# Patient Record
Sex: MALE | Race: WHITE | Employment: FULL TIME | URBAN - NONMETROPOLITAN AREA
[De-identification: names, ages, dates, MRNs, and addresses within clinical notes are randomized per-mention and may not be internally consistent; named-entity substitution may affect disease eponyms.]

---

## 2017-02-18 DIAGNOSIS — I10 ESSENTIAL HYPERTENSION: Primary | ICD-10-CM

## 2017-02-18 RX ORDER — NEBIVOLOL HYDROCHLORIDE 5 MG/1
TABLET ORAL
Qty: 30 TABLET | Refills: 0 | Status: SHIPPED | OUTPATIENT
Start: 2017-02-18 | End: 2017-03-17

## 2017-03-17 RX ORDER — NEBIVOLOL HYDROCHLORIDE 5 MG/1
TABLET ORAL
Qty: 30 TABLET | Refills: 0 | Status: SHIPPED | OUTPATIENT
Start: 2017-03-17 | End: 2017-04-20

## 2017-04-14 ENCOUNTER — APPOINTMENT (OUTPATIENT)
Dept: LAB | Age: 67
End: 2017-04-14
Attending: FAMILY MEDICINE
Payer: COMMERCIAL

## 2017-04-14 ENCOUNTER — OFFICE VISIT (OUTPATIENT)
Dept: FAMILY MEDICINE CLINIC | Facility: CLINIC | Age: 67
End: 2017-04-14

## 2017-04-14 VITALS
SYSTOLIC BLOOD PRESSURE: 130 MMHG | HEIGHT: 72.75 IN | WEIGHT: 188.25 LBS | DIASTOLIC BLOOD PRESSURE: 68 MMHG | TEMPERATURE: 98 F | BODY MASS INDEX: 24.95 KG/M2

## 2017-04-14 DIAGNOSIS — Z72.0 TOBACCO ABUSE: ICD-10-CM

## 2017-04-14 DIAGNOSIS — I10 ESSENTIAL HYPERTENSION: ICD-10-CM

## 2017-04-14 DIAGNOSIS — I10 ESSENTIAL HYPERTENSION: Primary | ICD-10-CM

## 2017-04-14 DIAGNOSIS — R73.9 HYPERGLYCEMIA: ICD-10-CM

## 2017-04-14 DIAGNOSIS — L30.9 ECZEMA, UNSPECIFIED TYPE: ICD-10-CM

## 2017-04-14 PROCEDURE — 83036 HEMOGLOBIN GLYCOSYLATED A1C: CPT | Performed by: FAMILY MEDICINE

## 2017-04-14 PROCEDURE — 80061 LIPID PANEL: CPT | Performed by: FAMILY MEDICINE

## 2017-04-14 PROCEDURE — 36415 COLL VENOUS BLD VENIPUNCTURE: CPT | Performed by: FAMILY MEDICINE

## 2017-04-14 PROCEDURE — 99214 OFFICE O/P EST MOD 30 MIN: CPT | Performed by: FAMILY MEDICINE

## 2017-04-14 PROCEDURE — 82043 UR ALBUMIN QUANTITATIVE: CPT | Performed by: FAMILY MEDICINE

## 2017-04-14 PROCEDURE — 80053 COMPREHEN METABOLIC PANEL: CPT | Performed by: FAMILY MEDICINE

## 2017-04-14 PROCEDURE — 82570 ASSAY OF URINE CREATININE: CPT | Performed by: FAMILY MEDICINE

## 2017-04-14 NOTE — PROGRESS NOTES
HPI:    Patient ID: Dewayne Heart is a 69year old male. Pt doing well  BP stable  W/o problems w/ meds  + rash skin / dry / itchy  HPI    Review of Systems   Constitutional: Negative for fever and chills. HENT: Negative.     Respiratory: Negative for

## 2017-04-15 DIAGNOSIS — I10 ESSENTIAL HYPERTENSION: Primary | ICD-10-CM

## 2017-04-15 DIAGNOSIS — R73.9 HYPERGLYCEMIA: ICD-10-CM

## 2017-04-20 RX ORDER — NEBIVOLOL HYDROCHLORIDE 5 MG/1
TABLET ORAL
Qty: 30 TABLET | Refills: 0 | Status: SHIPPED | OUTPATIENT
Start: 2017-04-20 | End: 2017-05-18

## 2017-05-18 RX ORDER — NEBIVOLOL HYDROCHLORIDE 5 MG/1
TABLET ORAL
Qty: 30 TABLET | Refills: 6 | Status: SHIPPED | OUTPATIENT
Start: 2017-05-18 | End: 2017-12-01

## 2017-10-20 ENCOUNTER — APPOINTMENT (OUTPATIENT)
Dept: LAB | Age: 67
End: 2017-10-20
Attending: FAMILY MEDICINE
Payer: COMMERCIAL

## 2017-10-20 ENCOUNTER — OFFICE VISIT (OUTPATIENT)
Dept: FAMILY MEDICINE CLINIC | Facility: CLINIC | Age: 67
End: 2017-10-20

## 2017-10-20 VITALS
HEIGHT: 72 IN | SYSTOLIC BLOOD PRESSURE: 120 MMHG | HEART RATE: 67 BPM | DIASTOLIC BLOOD PRESSURE: 70 MMHG | BODY MASS INDEX: 25.07 KG/M2 | WEIGHT: 185.13 LBS | OXYGEN SATURATION: 99 % | TEMPERATURE: 98 F

## 2017-10-20 DIAGNOSIS — Z72.0 TOBACCO ABUSE: ICD-10-CM

## 2017-10-20 DIAGNOSIS — Z23 NEED FOR VACCINATION: ICD-10-CM

## 2017-10-20 DIAGNOSIS — I10 ESSENTIAL HYPERTENSION: Primary | ICD-10-CM

## 2017-10-20 DIAGNOSIS — R73.9 HYPERGLYCEMIA: ICD-10-CM

## 2017-10-20 PROCEDURE — 90471 IMMUNIZATION ADMIN: CPT | Performed by: FAMILY MEDICINE

## 2017-10-20 PROCEDURE — 99214 OFFICE O/P EST MOD 30 MIN: CPT | Performed by: FAMILY MEDICINE

## 2017-10-20 PROCEDURE — 90732 PPSV23 VACC 2 YRS+ SUBQ/IM: CPT | Performed by: FAMILY MEDICINE

## 2017-10-20 NOTE — PROGRESS NOTES
HPI:    Patient ID: Lucille Peterson is a 69year old male. Pt doing well  BP stable  + diet / wt loss  HPI    Review of Systems   Constitutional: Negative for fatigue. Respiratory: Negative for cough and shortness of breath.     Cardiovascular: Negative

## 2017-10-23 DIAGNOSIS — R73.9 HYPERGLYCEMIA: Primary | ICD-10-CM

## 2017-10-23 DIAGNOSIS — I10 ESSENTIAL HYPERTENSION: ICD-10-CM

## 2017-12-01 RX ORDER — NEBIVOLOL HYDROCHLORIDE 5 MG/1
TABLET ORAL
Qty: 30 TABLET | Refills: 2 | Status: SHIPPED | OUTPATIENT
Start: 2017-12-01 | End: 2018-02-23

## 2018-02-03 ENCOUNTER — NURSE ONLY (OUTPATIENT)
Dept: FAMILY MEDICINE CLINIC | Facility: CLINIC | Age: 68
End: 2018-02-03

## 2018-02-03 VITALS — DIASTOLIC BLOOD PRESSURE: 70 MMHG | SYSTOLIC BLOOD PRESSURE: 122 MMHG

## 2018-02-12 ENCOUNTER — NURSE ONLY (OUTPATIENT)
Dept: FAMILY MEDICINE CLINIC | Facility: CLINIC | Age: 68
End: 2018-02-12

## 2018-02-19 ENCOUNTER — OFFICE VISIT (OUTPATIENT)
Dept: FAMILY MEDICINE CLINIC | Facility: CLINIC | Age: 68
End: 2018-02-19

## 2018-02-19 ENCOUNTER — APPOINTMENT (OUTPATIENT)
Dept: LAB | Age: 68
End: 2018-02-19
Attending: FAMILY MEDICINE
Payer: COMMERCIAL

## 2018-02-19 VITALS
BODY MASS INDEX: 26.04 KG/M2 | WEIGHT: 186 LBS | RESPIRATION RATE: 12 BRPM | TEMPERATURE: 98 F | SYSTOLIC BLOOD PRESSURE: 128 MMHG | HEART RATE: 60 BPM | DIASTOLIC BLOOD PRESSURE: 70 MMHG | HEIGHT: 70.75 IN

## 2018-02-19 VITALS — SYSTOLIC BLOOD PRESSURE: 128 MMHG | DIASTOLIC BLOOD PRESSURE: 70 MMHG

## 2018-02-19 DIAGNOSIS — R73.9 HYPERGLYCEMIA: ICD-10-CM

## 2018-02-19 DIAGNOSIS — Z72.0 TOBACCO ABUSE: ICD-10-CM

## 2018-02-19 DIAGNOSIS — I10 ESSENTIAL HYPERTENSION: Primary | ICD-10-CM

## 2018-02-19 LAB
ALBUMIN SERPL-MCNC: 3.7 G/DL (ref 3.5–4.8)
ALP LIVER SERPL-CCNC: 68 U/L (ref 45–117)
ALT SERPL-CCNC: 21 U/L (ref 17–63)
AST SERPL-CCNC: 17 U/L (ref 15–41)
BILIRUB SERPL-MCNC: 0.4 MG/DL (ref 0.1–2)
BUN BLD-MCNC: 10 MG/DL (ref 8–20)
CALCIUM BLD-MCNC: 9.4 MG/DL (ref 8.3–10.3)
CHLORIDE: 107 MMOL/L (ref 101–111)
CHOLEST SMN-MCNC: 144 MG/DL (ref ?–200)
CO2: 27 MMOL/L (ref 22–32)
CREAT BLD-MCNC: 0.76 MG/DL (ref 0.7–1.3)
CREAT UR-SCNC: 46.3 MG/DL
EST. AVERAGE GLUCOSE BLD GHB EST-MCNC: 137 MG/DL (ref 68–126)
GLUCOSE BLD-MCNC: 108 MG/DL (ref 70–99)
HBA1C MFR BLD HPLC: 6.4 % (ref ?–5.7)
HDLC SERPL-MCNC: 36 MG/DL (ref 45–?)
HDLC SERPL: 4 {RATIO} (ref ?–4.97)
LDLC SERPL CALC-MCNC: 85 MG/DL (ref ?–130)
M PROTEIN MFR SERPL ELPH: 7.4 G/DL (ref 6.1–8.3)
MICROALBUMIN UR-MCNC: <0.5 MG/DL
NONHDLC SERPL-MCNC: 108 MG/DL (ref ?–130)
POTASSIUM SERPL-SCNC: 4.8 MMOL/L (ref 3.6–5.1)
SODIUM SERPL-SCNC: 139 MMOL/L (ref 136–144)
TRIGL SERPL-MCNC: 115 MG/DL (ref ?–150)
VLDLC SERPL CALC-MCNC: 23 MG/DL (ref 5–40)

## 2018-02-19 PROCEDURE — 99214 OFFICE O/P EST MOD 30 MIN: CPT | Performed by: FAMILY MEDICINE

## 2018-02-19 NOTE — PROGRESS NOTES
HPI:    Patient ID: Praveena Gandhi is a 77year old male. BP stable  W/o problems w/ meds    HPI    Review of Systems   Respiratory: Negative for cough and shortness of breath. Cardiovascular: Negative for chest pain, palpitations and leg swelling.

## 2018-02-20 ENCOUNTER — TELEPHONE (OUTPATIENT)
Dept: FAMILY MEDICINE CLINIC | Facility: CLINIC | Age: 68
End: 2018-02-20

## 2018-02-20 DIAGNOSIS — I10 ESSENTIAL HYPERTENSION: Primary | ICD-10-CM

## 2018-02-20 DIAGNOSIS — Z00.00 ROUTINE HEALTH MAINTENANCE: ICD-10-CM

## 2018-02-20 DIAGNOSIS — R73.9 HYPERGLYCEMIA: ICD-10-CM

## 2018-02-20 NOTE — TELEPHONE ENCOUNTER
Patient is not happy with increase in Hgbaic; he hasn't done anything different. Advised- if he hasn't done anything from dietary or exercise standpoint; then it is his body's metabolism.   ej/cj

## 2018-02-23 RX ORDER — NEBIVOLOL HYDROCHLORIDE 5 MG/1
TABLET ORAL
Qty: 30 TABLET | Refills: 3 | Status: SHIPPED | OUTPATIENT
Start: 2018-02-23 | End: 2018-06-18

## 2018-06-18 RX ORDER — NEBIVOLOL HYDROCHLORIDE 5 MG/1
TABLET ORAL
Qty: 30 TABLET | Refills: 2 | Status: SHIPPED | OUTPATIENT
Start: 2018-06-18 | End: 2018-10-22

## 2018-09-21 ENCOUNTER — OFFICE VISIT (OUTPATIENT)
Dept: FAMILY MEDICINE CLINIC | Facility: CLINIC | Age: 68
End: 2018-09-21
Payer: COMMERCIAL

## 2018-09-21 ENCOUNTER — APPOINTMENT (OUTPATIENT)
Dept: LAB | Age: 68
End: 2018-09-21
Attending: FAMILY MEDICINE
Payer: COMMERCIAL

## 2018-09-21 VITALS
HEIGHT: 70 IN | HEART RATE: 64 BPM | RESPIRATION RATE: 12 BRPM | SYSTOLIC BLOOD PRESSURE: 120 MMHG | DIASTOLIC BLOOD PRESSURE: 62 MMHG | BODY MASS INDEX: 26.13 KG/M2 | WEIGHT: 182.5 LBS | TEMPERATURE: 97 F

## 2018-09-21 DIAGNOSIS — H61.23 BILATERAL IMPACTED CERUMEN: Primary | ICD-10-CM

## 2018-09-21 DIAGNOSIS — I10 ESSENTIAL HYPERTENSION: ICD-10-CM

## 2018-09-21 DIAGNOSIS — R73.9 HYPERGLYCEMIA: ICD-10-CM

## 2018-09-21 DIAGNOSIS — Z72.0 TOBACCO ABUSE: ICD-10-CM

## 2018-09-21 LAB
ALBUMIN SERPL-MCNC: 3.9 G/DL (ref 3.5–4.8)
ALBUMIN/GLOB SERPL: 1 {RATIO} (ref 1–2)
ALP LIVER SERPL-CCNC: 69 U/L (ref 45–117)
ALT SERPL-CCNC: 22 U/L (ref 17–63)
ANION GAP SERPL CALC-SCNC: 6 MMOL/L (ref 0–18)
AST SERPL-CCNC: 20 U/L (ref 15–41)
BILIRUB SERPL-MCNC: 0.5 MG/DL (ref 0.1–2)
BUN BLD-MCNC: 11 MG/DL (ref 8–20)
BUN/CREAT SERPL: 14.1 (ref 10–20)
CALCIUM BLD-MCNC: 9.4 MG/DL (ref 8.3–10.3)
CHLORIDE SERPL-SCNC: 104 MMOL/L (ref 101–111)
CHOLEST SMN-MCNC: 153 MG/DL (ref ?–200)
CO2 SERPL-SCNC: 26 MMOL/L (ref 22–32)
CREAT BLD-MCNC: 0.78 MG/DL (ref 0.7–1.3)
CREAT UR-SCNC: 126 MG/DL
EST. AVERAGE GLUCOSE BLD GHB EST-MCNC: 126 MG/DL (ref 68–126)
GLOBULIN PLAS-MCNC: 4.1 G/DL (ref 2.5–4)
GLUCOSE BLD-MCNC: 109 MG/DL (ref 70–99)
HBA1C MFR BLD HPLC: 6 % (ref ?–5.7)
HDLC SERPL-MCNC: 36 MG/DL (ref 40–59)
LDLC SERPL CALC-MCNC: 94 MG/DL (ref ?–100)
M PROTEIN MFR SERPL ELPH: 8 G/DL (ref 6.1–8.3)
MICROALBUMIN UR-MCNC: 1.07 MG/DL
MICROALBUMIN/CREAT 24H UR-RTO: 8.5 UG/MG (ref ?–30)
NONHDLC SERPL-MCNC: 117 MG/DL (ref ?–130)
OSMOLALITY SERPL CALC.SUM OF ELEC: 282 MOSM/KG (ref 275–295)
POTASSIUM SERPL-SCNC: 5.6 MMOL/L (ref 3.6–5.1)
SODIUM SERPL-SCNC: 136 MMOL/L (ref 136–144)
TRIGL SERPL-MCNC: 117 MG/DL (ref 30–149)
VLDLC SERPL CALC-MCNC: 23 MG/DL (ref 0–30)

## 2018-09-21 PROCEDURE — 69210 REMOVE IMPACTED EAR WAX UNI: CPT | Performed by: FAMILY MEDICINE

## 2018-09-21 PROCEDURE — 99214 OFFICE O/P EST MOD 30 MIN: CPT | Performed by: FAMILY MEDICINE

## 2018-09-21 NOTE — PROGRESS NOTES
HPI:    Patient ID: Siddhartha Dubose is a 77year old male. BP stable w/o problems w/ meds  Breathing OK  Hearing loss w/o pain x months  W/o cough / cold  HPI    Review of Systems   Constitutional: Negative for chills, fatigue and fever.    HENT: Positive

## 2018-09-25 ENCOUNTER — TELEPHONE (OUTPATIENT)
Dept: FAMILY MEDICINE CLINIC | Facility: CLINIC | Age: 68
End: 2018-09-25

## 2018-09-25 DIAGNOSIS — R73.9 HYPERGLYCEMIA: ICD-10-CM

## 2018-09-25 DIAGNOSIS — E87.5 SERUM POTASSIUM ELEVATED: ICD-10-CM

## 2018-09-25 DIAGNOSIS — I10 ESSENTIAL HYPERTENSION: Primary | ICD-10-CM

## 2018-09-25 NOTE — TELEPHONE ENCOUNTER
----- Message from Shayy Robin DO sent at 9/23/2018  9:41 AM CDT -----  Labs stable. Recheck same lab in 6 months. Potassium elevated recommend rechecking potassium. Question whether from hemolysis.

## 2018-10-06 ENCOUNTER — NURSE ONLY (OUTPATIENT)
Dept: FAMILY MEDICINE CLINIC | Facility: CLINIC | Age: 68
End: 2018-10-06
Payer: COMMERCIAL

## 2018-10-06 DIAGNOSIS — E87.5 SERUM POTASSIUM ELEVATED: ICD-10-CM

## 2018-10-06 DIAGNOSIS — I10 ESSENTIAL HYPERTENSION: ICD-10-CM

## 2018-10-06 DIAGNOSIS — R73.9 HYPERGLYCEMIA: ICD-10-CM

## 2018-10-06 PROCEDURE — 84132 ASSAY OF SERUM POTASSIUM: CPT | Performed by: FAMILY MEDICINE

## 2018-10-06 PROCEDURE — 36415 COLL VENOUS BLD VENIPUNCTURE: CPT | Performed by: FAMILY MEDICINE

## 2018-10-06 NOTE — PROGRESS NOTES
1 mint tube collected from R AC using straight needle and 1 attempt    Pt tolerated and was sent home in stable condition

## 2018-10-22 ENCOUNTER — TELEPHONE (OUTPATIENT)
Dept: FAMILY MEDICINE CLINIC | Facility: CLINIC | Age: 68
End: 2018-10-22

## 2018-10-22 DIAGNOSIS — I10 ESSENTIAL HYPERTENSION: Primary | ICD-10-CM

## 2018-10-22 RX ORDER — NEBIVOLOL 5 MG/1
5 TABLET ORAL
Qty: 30 TABLET | Refills: 2 | Status: SHIPPED | OUTPATIENT
Start: 2018-10-22 | End: 2019-01-11

## 2019-01-11 DIAGNOSIS — I10 ESSENTIAL HYPERTENSION: ICD-10-CM

## 2019-01-11 DIAGNOSIS — R73.9 HYPERGLYCEMIA: Primary | ICD-10-CM

## 2019-01-11 RX ORDER — NEBIVOLOL HYDROCHLORIDE 5 MG/1
TABLET ORAL
Qty: 30 TABLET | Refills: 2 | Status: SHIPPED | OUTPATIENT
Start: 2019-01-11 | End: 2019-04-05

## 2019-03-19 ENCOUNTER — PATIENT OUTREACH (OUTPATIENT)
Dept: FAMILY MEDICINE CLINIC | Facility: CLINIC | Age: 69
End: 2019-03-19

## 2019-04-04 ENCOUNTER — HOSPITAL (OUTPATIENT)
Dept: OTHER | Age: 69
End: 2019-04-04

## 2019-04-04 LAB
ANALYZER ANC (IANC): ABNORMAL
ANION GAP SERPL CALC-SCNC: 16 MMOL/L (ref 10–20)
APTT PPP: 25 SECONDS (ref 22–32)
APTT PPP: NORMAL S
BASOPHILS # BLD: 0.1 THOUSAND/MCL (ref 0–0.3)
BASOPHILS NFR BLD: 0 %
BUN SERPL-MCNC: 11 MG/DL (ref 6–20)
BUN/CREAT SERPL: 18 (ref 7–25)
CALCIUM SERPL-MCNC: 9.4 MG/DL (ref 8.4–10.2)
CHLORIDE: 106 MMOL/L (ref 98–107)
CO2 SERPL-SCNC: 25 MMOL/L (ref 21–32)
CREAT SERPL-MCNC: 0.6 MG/DL (ref 0.67–1.17)
DIFFERENTIAL METHOD BLD: ABNORMAL
EOSINOPHIL # BLD: 0.1 THOUSAND/MCL (ref 0.1–0.5)
EOSINOPHIL NFR BLD: 1 %
ERYTHROCYTE [DISTWIDTH] IN BLOOD: 12.9 % (ref 11–15)
GLUCOSE SERPL-MCNC: 132 MG/DL (ref 65–99)
HEMATOCRIT: 45.4 % (ref 39–51)
HGB BLD-MCNC: 15.4 GM/DL (ref 13–17)
IMM GRANULOCYTES # BLD AUTO: 0.1 THOUSAND/MCL (ref 0–0.2)
IMM GRANULOCYTES NFR BLD: 1 %
INR PPP: 1
LYMPHOCYTES # BLD: 1.6 THOUSAND/MCL (ref 1–4)
LYMPHOCYTES NFR BLD: 13 %
MCH RBC QN AUTO: 33.3 PG (ref 26–34)
MCHC RBC AUTO-ENTMCNC: 33.9 GM/DL (ref 32–36.5)
MCV RBC AUTO: 98.1 FL (ref 78–100)
MONOCYTES # BLD: 0.6 THOUSAND/MCL (ref 0.3–0.9)
MONOCYTES NFR BLD: 5 %
NEUTROPHILS # BLD: 10 THOUSAND/MCL (ref 1.8–7.7)
NEUTROPHILS NFR BLD: 80 %
NEUTS SEG NFR BLD: ABNORMAL %
NRBC (NRBCRE): 0 /100 WBC
PLATELET # BLD: 259 THOUSAND/MCL (ref 140–450)
POTASSIUM SERPL-SCNC: 5 MMOL/L (ref 3.4–5.1)
PROTHROMBIN TIME: 10.5 SECONDS (ref 9.7–11.8)
PROTHROMBIN TIME: NORMAL
RBC # BLD: 4.63 MILLION/MCL (ref 4.5–5.9)
SODIUM SERPL-SCNC: 142 MMOL/L (ref 135–145)
WBC # BLD: 12.4 THOUSAND/MCL (ref 4.2–11)

## 2019-04-05 DIAGNOSIS — I10 ESSENTIAL HYPERTENSION: ICD-10-CM

## 2019-04-05 LAB
ANALYZER ANC (IANC): NORMAL
ANION GAP SERPL CALC-SCNC: 16 MMOL/L (ref 10–20)
BUN SERPL-MCNC: 9 MG/DL (ref 6–20)
BUN/CREAT SERPL: 14 (ref 7–25)
CALCIUM SERPL-MCNC: 8.8 MG/DL (ref 8.4–10.2)
CHLORIDE: 104 MMOL/L (ref 98–107)
CO2 SERPL-SCNC: 24 MMOL/L (ref 21–32)
CREAT SERPL-MCNC: 0.64 MG/DL (ref 0.67–1.17)
ERYTHROCYTE [DISTWIDTH] IN BLOOD: 13 % (ref 11–15)
GLUCOSE SERPL-MCNC: 126 MG/DL (ref 65–99)
HEMATOCRIT: 44.6 % (ref 39–51)
HGB BLD-MCNC: 14.7 GM/DL (ref 13–17)
MCH RBC QN AUTO: 32.5 PG (ref 26–34)
MCHC RBC AUTO-ENTMCNC: 33 GM/DL (ref 32–36.5)
MCV RBC AUTO: 98.5 FL (ref 78–100)
NRBC (NRBCRE): 0 /100 WBC
PLATELET # BLD: 250 THOUSAND/MCL (ref 140–450)
POTASSIUM SERPL-SCNC: 4 MMOL/L (ref 3.4–5.1)
RBC # BLD: 4.53 MILLION/MCL (ref 4.5–5.9)
SODIUM SERPL-SCNC: 140 MMOL/L (ref 135–145)
WBC # BLD: 9.4 THOUSAND/MCL (ref 4.2–11)

## 2019-04-05 RX ORDER — NEBIVOLOL HYDROCHLORIDE 5 MG/1
TABLET ORAL
Qty: 30 TABLET | Refills: 0 | Status: SHIPPED | OUTPATIENT
Start: 2019-04-05 | End: 2019-05-03

## 2019-04-05 NOTE — TELEPHONE ENCOUNTER
Last office visit 9-21-18  Last BP 9-21-18 120/62  Last refill 1-11-19 #30 with 2  Labs due.   Future Appointments   Date Time Provider Iván Rodriguez   4/22/2019  8:30 AM Angelita Croft DO [facility] [place]   4/22/2019  9:00 AM REF [facility] E

## 2019-04-06 ENCOUNTER — TELEPHONE (OUTPATIENT)
Dept: FAMILY MEDICINE CLINIC | Facility: CLINIC | Age: 69
End: 2019-04-06
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
## 2019-04-08 ENCOUNTER — TELEPHONE (OUTPATIENT)
Dept: NEUROSURGERY | Age: 69
End: 2019-04-08
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
## 2019-04-10 ENCOUNTER — TELEPHONE (OUTPATIENT)
Dept: NEUROSURGERY | Age: 69
End: 2019-04-10

## 2019-05-03 ENCOUNTER — OFFICE VISIT (OUTPATIENT)
Dept: NEUROSURGERY | Age: 69
End: 2019-05-03

## 2019-05-03 ENCOUNTER — APPOINTMENT (OUTPATIENT)
Dept: NEUROSURGERY | Age: 69
End: 2019-05-03

## 2019-05-03 ENCOUNTER — TELEPHONE (OUTPATIENT)
Dept: FAMILY MEDICINE CLINIC | Facility: CLINIC | Age: 69
End: 2019-05-03

## 2019-05-03 ENCOUNTER — HOSPITAL (OUTPATIENT)
Dept: OTHER | Age: 69
End: 2019-05-03

## 2019-05-03 ENCOUNTER — IMAGING SERVICES (OUTPATIENT)
Dept: GENERAL RADIOLOGY | Age: 69
End: 2019-05-03

## 2019-05-03 VITALS
DIASTOLIC BLOOD PRESSURE: 88 MMHG | OXYGEN SATURATION: 97 % | WEIGHT: 185 LBS | BODY MASS INDEX: 25.06 KG/M2 | HEART RATE: 64 BPM | SYSTOLIC BLOOD PRESSURE: 161 MMHG | RESPIRATION RATE: 16 BRPM | TEMPERATURE: 97.7 F | HEIGHT: 72 IN

## 2019-05-03 DIAGNOSIS — I10 ESSENTIAL HYPERTENSION: ICD-10-CM

## 2019-05-03 DIAGNOSIS — S19.9XXD NECK INJURY, SUBSEQUENT ENCOUNTER: ICD-10-CM

## 2019-05-03 DIAGNOSIS — S12.401D CLOSED NONDISPLACED FRACTURE OF FIFTH CERVICAL VERTEBRA WITH ROUTINE HEALING, UNSPECIFIED FRACTURE MORPHOLOGY, SUBSEQUENT ENCOUNTER: Primary | ICD-10-CM

## 2019-05-03 PROCEDURE — 99213 OFFICE O/P EST LOW 20 MIN: CPT | Performed by: PHYSICIAN ASSISTANT

## 2019-05-03 PROCEDURE — 72040 X-RAY EXAM NECK SPINE 2-3 VW: CPT | Performed by: INTERNAL MEDICINE

## 2019-05-03 RX ORDER — ASPIRIN 81 MG/1
81 TABLET ORAL
COMMUNITY

## 2019-05-03 RX ORDER — NEBIVOLOL 5 MG/1
TABLET ORAL
COMMUNITY
Start: 2019-05-03

## 2019-05-03 RX ORDER — NEBIVOLOL HYDROCHLORIDE 5 MG/1
TABLET ORAL
Qty: 30 TABLET | Refills: 0 | Status: SHIPPED | OUTPATIENT
Start: 2019-05-03 | End: 2019-06-02

## 2019-05-03 ASSESSMENT — ENCOUNTER SYMPTOMS
NUMBNESS: 0
CONSTITUTIONAL NEGATIVE: 1
WEAKNESS: 0
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
## 2019-05-04 NOTE — TELEPHONE ENCOUNTER
Left message for patient informing him he can schedule physical per last telephone encounter this month but if he has further questions or concerns can return call and discuss.

## 2019-05-04 NOTE — TELEPHONE ENCOUNTER
Returned patient phone call. Pt is under the impression you would like the neck issue \"resolved\" prior to physical. Does he need to be cleared from the neck brace prior to physical?    Please call patient back with Dr. Susanna Bridges response at [phone].

## 2019-05-20 ENCOUNTER — APPOINTMENT (OUTPATIENT)
Dept: LAB | Age: 69
End: 2019-05-20
Attending: FAMILY MEDICINE
Payer: COMMERCIAL

## 2019-05-20 ENCOUNTER — OFFICE VISIT (OUTPATIENT)
Dept: FAMILY MEDICINE CLINIC | Facility: CLINIC | Age: 69
End: 2019-05-20
Payer: COMMERCIAL

## 2019-05-20 VITALS
BODY MASS INDEX: 25.6 KG/M2 | DIASTOLIC BLOOD PRESSURE: 70 MMHG | HEART RATE: 64 BPM | OXYGEN SATURATION: 98 % | TEMPERATURE: 98 F | HEIGHT: 72 IN | WEIGHT: 189 LBS | SYSTOLIC BLOOD PRESSURE: 120 MMHG

## 2019-05-20 DIAGNOSIS — L30.9 ECZEMA, UNSPECIFIED TYPE: ICD-10-CM

## 2019-05-20 DIAGNOSIS — Z87.81 H/O CERVICAL FRACTURE: ICD-10-CM

## 2019-05-20 DIAGNOSIS — I10 ESSENTIAL HYPERTENSION: ICD-10-CM

## 2019-05-20 DIAGNOSIS — Z00.00 ROUTINE HEALTH MAINTENANCE: Primary | ICD-10-CM

## 2019-05-20 DIAGNOSIS — Z12.11 COLON CANCER SCREENING: ICD-10-CM

## 2019-05-20 DIAGNOSIS — R73.9 HYPERGLYCEMIA: ICD-10-CM

## 2019-05-20 DIAGNOSIS — Z72.0 TOBACCO ABUSE: ICD-10-CM

## 2019-05-20 PROCEDURE — 99397 PER PM REEVAL EST PAT 65+ YR: CPT | Performed by: FAMILY MEDICINE

## 2019-05-20 RX ORDER — CLOBETASOL PROPIONATE 0.5 MG/G
CREAM TOPICAL
Qty: 45 G | Refills: 1 | Status: SHIPPED | OUTPATIENT
Start: 2019-05-20 | End: 2020-12-30

## 2019-05-20 NOTE — PROGRESS NOTES
Berna Dahl is a 68year old male. No chief complaint on file.       HPI:   Px  S/p Fracture neck - immob on  W/o problems w/ meds  B stable    Current Outpatient Medications on File Prior to Visit:  BYSTOLIC 5 MG Oral Tab TAKE 1 TABLET(5 MG) BY MOUTH fracture,c5  Essential hypertension  Hyperglycemia  Tobacco abuse  Eczema, unspecified type  Colon cancer screening  Routine health maintenance  (primary encounter diagnosis)    No orders of the defined types were placed in this encounter.       Meds & Refi

## 2019-05-21 DIAGNOSIS — I10 ESSENTIAL HYPERTENSION: Primary | ICD-10-CM

## 2019-05-21 DIAGNOSIS — R73.9 HYPERGLYCEMIA: ICD-10-CM

## 2019-06-02 DIAGNOSIS — I10 ESSENTIAL HYPERTENSION: ICD-10-CM

## 2019-06-03 RX ORDER — NEBIVOLOL HYDROCHLORIDE 5 MG/1
TABLET ORAL
Qty: 30 TABLET | Refills: 0 | Status: SHIPPED | OUTPATIENT
Start: 2019-06-03 | End: 2019-07-12

## 2019-06-04 ENCOUNTER — APPOINTMENT (OUTPATIENT)
Dept: GENERAL RADIOLOGY | Age: 69
End: 2019-06-04

## 2019-06-04 ENCOUNTER — APPOINTMENT (OUTPATIENT)
Dept: NEUROSURGERY | Age: 69
End: 2019-06-04

## 2019-06-12 ENCOUNTER — OFFICE VISIT (OUTPATIENT)
Dept: NEUROSURGERY | Age: 69
End: 2019-06-12

## 2019-06-12 ENCOUNTER — IMAGING SERVICES (OUTPATIENT)
Dept: GENERAL RADIOLOGY | Age: 69
End: 2019-06-12

## 2019-06-12 VITALS
HEIGHT: 72 IN | WEIGHT: 185 LBS | SYSTOLIC BLOOD PRESSURE: 163 MMHG | DIASTOLIC BLOOD PRESSURE: 77 MMHG | HEART RATE: 67 BPM | BODY MASS INDEX: 25.06 KG/M2 | OXYGEN SATURATION: 99 % | RESPIRATION RATE: 18 BRPM

## 2019-06-12 DIAGNOSIS — S12.401D CLOSED NONDISPLACED FRACTURE OF FIFTH CERVICAL VERTEBRA WITH ROUTINE HEALING, UNSPECIFIED FRACTURE MORPHOLOGY, SUBSEQUENT ENCOUNTER: Primary | ICD-10-CM

## 2019-06-12 PROCEDURE — 99212 OFFICE O/P EST SF 10 MIN: CPT | Performed by: NURSE PRACTITIONER

## 2019-06-12 PROCEDURE — 72050 X-RAY EXAM NECK SPINE 4/5VWS: CPT | Performed by: INTERNAL MEDICINE

## 2019-07-12 ENCOUNTER — TELEPHONE (OUTPATIENT)
Dept: FAMILY MEDICINE CLINIC | Facility: CLINIC | Age: 69
End: 2019-07-12

## 2019-07-12 DIAGNOSIS — I10 ESSENTIAL HYPERTENSION: ICD-10-CM

## 2019-07-12 DIAGNOSIS — I10 ESSENTIAL HYPERTENSION: Primary | ICD-10-CM

## 2019-07-12 RX ORDER — NEBIVOLOL 5 MG/1
5 TABLET ORAL
Qty: 30 TABLET | Refills: 5 | Status: SHIPPED | OUTPATIENT
Start: 2019-07-12 | End: 2019-07-16

## 2019-07-16 RX ORDER — METOPROLOL TARTRATE 100 MG/1
100 TABLET ORAL DAILY
Qty: 30 TABLET | Refills: 0 | Status: SHIPPED | OUTPATIENT
Start: 2019-07-16 | End: 2019-08-14

## 2019-07-16 NOTE — TELEPHONE ENCOUNTER
Received notification that Bystolic is denied for coverage. Must try formulary approved medications first and fail before bystolic would be considered. ADVISED- Dr Melinda Hall recommends changing to Lopressor 100mg po daily.  Follow up with Dr Melinda Hall in 2 to

## 2019-07-17 RX ORDER — METOPROLOL TARTRATE 100 MG/1
100 TABLET ORAL DAILY
Qty: 30 TABLET | Refills: 0 | Status: SHIPPED | OUTPATIENT
Start: 2019-07-17 | End: 2019-08-03

## 2019-07-17 NOTE — TELEPHONE ENCOUNTER
Patient returns phone call. Informed of Karolina's message. Verbalizes understanding. Lopressor sent to [facility] in [place] #30. Patient states he will call back to schedule recheck (has enough Bystolic to get through until Sunday).

## 2019-08-03 ENCOUNTER — OFFICE VISIT (OUTPATIENT)
Dept: FAMILY MEDICINE CLINIC | Facility: CLINIC | Age: 69
End: 2019-08-03
Payer: COMMERCIAL

## 2019-08-03 VITALS
OXYGEN SATURATION: 99 % | TEMPERATURE: 98 F | HEART RATE: 60 BPM | DIASTOLIC BLOOD PRESSURE: 80 MMHG | SYSTOLIC BLOOD PRESSURE: 130 MMHG | HEIGHT: 70 IN | BODY MASS INDEX: 27.22 KG/M2 | WEIGHT: 190.13 LBS | RESPIRATION RATE: 12 BRPM

## 2019-08-03 DIAGNOSIS — Z72.0 TOBACCO ABUSE: ICD-10-CM

## 2019-08-03 DIAGNOSIS — I10 ESSENTIAL HYPERTENSION: Primary | ICD-10-CM

## 2019-08-03 PROCEDURE — 99214 OFFICE O/P EST MOD 30 MIN: CPT | Performed by: FAMILY MEDICINE

## 2019-08-03 NOTE — PROGRESS NOTES
HPI:    Patient ID: Angel Sunshine is a 68year old male. Pt doing well  Breathing OK  W/o problems w/ med  HPI    Review of Systems   Respiratory: Negative for cough and shortness of breath.     Cardiovascular: Negative for chest pain, palpitations and l

## 2019-08-14 DIAGNOSIS — I10 ESSENTIAL HYPERTENSION: ICD-10-CM

## 2019-08-15 RX ORDER — METOPROLOL TARTRATE 100 MG/1
TABLET ORAL
Qty: 90 TABLET | Refills: 0 | Status: SHIPPED | OUTPATIENT
Start: 2019-08-15 | End: 2019-11-14

## 2019-11-14 ENCOUNTER — TELEPHONE (OUTPATIENT)
Dept: FAMILY MEDICINE CLINIC | Facility: CLINIC | Age: 69
End: 2019-11-14

## 2019-11-14 DIAGNOSIS — I10 ESSENTIAL HYPERTENSION: ICD-10-CM

## 2019-11-14 RX ORDER — METOPROLOL TARTRATE 100 MG/1
TABLET ORAL
Qty: 90 TABLET | Refills: 0 | Status: SHIPPED | OUTPATIENT
Start: 2019-11-14 | End: 2020-02-12

## 2019-11-14 NOTE — TELEPHONE ENCOUNTER
REFILL METOPROLOL   CALL TO [facility] IN [place]   ( HAS APPT ON  11/21 BUT WILL RUN OUT ON 11/19)      USE THIS CELL TODAY [phone]

## 2019-11-21 ENCOUNTER — OFFICE VISIT (OUTPATIENT)
Dept: FAMILY MEDICINE CLINIC | Facility: CLINIC | Age: 69
End: 2019-11-21
Payer: COMMERCIAL

## 2019-11-21 VITALS
SYSTOLIC BLOOD PRESSURE: 130 MMHG | DIASTOLIC BLOOD PRESSURE: 64 MMHG | HEART RATE: 64 BPM | BODY MASS INDEX: 27 KG/M2 | RESPIRATION RATE: 12 BRPM | WEIGHT: 187 LBS | TEMPERATURE: 97 F

## 2019-11-21 DIAGNOSIS — Z72.0 TOBACCO ABUSE: Primary | ICD-10-CM

## 2019-11-21 DIAGNOSIS — R73.9 HYPERGLYCEMIA: ICD-10-CM

## 2019-11-21 DIAGNOSIS — I10 ESSENTIAL HYPERTENSION: ICD-10-CM

## 2019-11-21 PROCEDURE — 99214 OFFICE O/P EST MOD 30 MIN: CPT | Performed by: FAMILY MEDICINE

## 2019-11-21 RX ORDER — ACETAMINOPHEN 160 MG
2000 TABLET,DISINTEGRATING ORAL DAILY
COMMUNITY

## 2019-11-21 NOTE — PATIENT INSTRUCTIONS
Discontinue tobacco.  Regular exercise. Continue same medications. Monitor blood pressure. Call with laboratory studies. Call with questions or problems.

## 2019-11-22 DIAGNOSIS — R73.9 HYPERGLYCEMIA: Primary | ICD-10-CM

## 2019-11-22 DIAGNOSIS — I10 ESSENTIAL HYPERTENSION: ICD-10-CM

## 2020-02-12 DIAGNOSIS — I10 ESSENTIAL HYPERTENSION: ICD-10-CM

## 2020-02-12 RX ORDER — METOPROLOL TARTRATE 100 MG/1
TABLET ORAL
Qty: 90 TABLET | Refills: 0 | Status: SHIPPED | OUTPATIENT
Start: 2020-02-12 | End: 2020-05-12

## 2020-02-12 NOTE — TELEPHONE ENCOUNTER
Last OV: 11/21/2019  Last labs: 11/21/2019  Last filled: 11/14/2019 #90 no RF    No future appointments.

## 2020-04-30 ENCOUNTER — TELEPHONE (OUTPATIENT)
Dept: FAMILY MEDICINE CLINIC | Facility: CLINIC | Age: 70
End: 2020-04-30

## 2020-04-30 NOTE — TELEPHONE ENCOUNTER
Left detailed Vm on verified phone, need to know if he still has cologaurd kit it expires 5//19/2020, if he needs another kit I told him to give us a call

## 2020-05-11 ENCOUNTER — TELEPHONE (OUTPATIENT)
Dept: FAMILY MEDICINE CLINIC | Facility: CLINIC | Age: 70
End: 2020-05-11

## 2020-05-11 NOTE — TELEPHONE ENCOUNTER
Virtual/Telephone Check-In    [address] [place] [address] verbally consents to a Virtual/Telephone Check-In service on 05/12/2020. Patient understands and accepts financial responsibility for any deductible, co-insurance and/or co-pays associated with this service.

## 2020-05-12 ENCOUNTER — TELEMEDICINE (OUTPATIENT)
Dept: FAMILY MEDICINE CLINIC | Facility: CLINIC | Age: 70
End: 2020-05-12
Payer: COMMERCIAL

## 2020-05-12 DIAGNOSIS — Z72.0 TOBACCO ABUSE: ICD-10-CM

## 2020-05-12 DIAGNOSIS — I10 ESSENTIAL HYPERTENSION: Primary | ICD-10-CM

## 2020-05-12 DIAGNOSIS — R63.5 WEIGHT GAIN: ICD-10-CM

## 2020-05-12 DIAGNOSIS — R73.9 HYPERGLYCEMIA: ICD-10-CM

## 2020-05-12 PROCEDURE — 99214 OFFICE O/P EST MOD 30 MIN: CPT | Performed by: FAMILY MEDICINE

## 2020-05-12 RX ORDER — METOPROLOL TARTRATE 100 MG/1
TABLET ORAL
Qty: 90 TABLET | Refills: 1 | Status: SHIPPED | OUTPATIENT
Start: 2020-05-12 | End: 2020-11-05

## 2020-05-12 NOTE — PROGRESS NOTES
HPI:    Patient ID: Princess Bergman is a 65year old male. Video visit. [employer] platform  Review of patient's chart and medications. 20 minutes spent with the patient and with review. Patient states continues to work daily.   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

## 2020-06-25 ENCOUNTER — APPOINTMENT (OUTPATIENT)
Dept: LAB | Age: 70
End: 2020-06-25
Attending: FAMILY MEDICINE
Payer: COMMERCIAL

## 2020-06-25 DIAGNOSIS — R73.9 HYPERGLYCEMIA: ICD-10-CM

## 2020-06-25 DIAGNOSIS — I10 ESSENTIAL HYPERTENSION: ICD-10-CM

## 2020-06-25 PROCEDURE — 82570 ASSAY OF URINE CREATININE: CPT | Performed by: FAMILY MEDICINE

## 2020-06-25 PROCEDURE — 80053 COMPREHEN METABOLIC PANEL: CPT | Performed by: FAMILY MEDICINE

## 2020-06-25 PROCEDURE — 83036 HEMOGLOBIN GLYCOSYLATED A1C: CPT | Performed by: FAMILY MEDICINE

## 2020-06-25 PROCEDURE — 82043 UR ALBUMIN QUANTITATIVE: CPT | Performed by: FAMILY MEDICINE

## 2020-06-25 PROCEDURE — 36415 COLL VENOUS BLD VENIPUNCTURE: CPT | Performed by: FAMILY MEDICINE

## 2020-06-25 PROCEDURE — 80061 LIPID PANEL: CPT | Performed by: FAMILY MEDICINE

## 2020-06-30 DIAGNOSIS — R73.9 HYPERGLYCEMIA: Primary | ICD-10-CM

## 2020-06-30 DIAGNOSIS — I10 ESSENTIAL HYPERTENSION: ICD-10-CM

## 2020-11-05 ENCOUNTER — TELEPHONE (OUTPATIENT)
Dept: FAMILY MEDICINE CLINIC | Facility: CLINIC | Age: 70
End: 2020-11-05

## 2020-11-05 DIAGNOSIS — I10 ESSENTIAL HYPERTENSION: ICD-10-CM

## 2020-11-05 RX ORDER — METOPROLOL TARTRATE 100 MG/1
TABLET ORAL
Qty: 90 TABLET | Refills: 1 | Status: SHIPPED | OUTPATIENT
Start: 2020-11-05 | End: 2021-05-04

## 2020-11-09 DIAGNOSIS — I10 ESSENTIAL HYPERTENSION: ICD-10-CM

## 2020-11-11 RX ORDER — METOPROLOL TARTRATE 100 MG/1
TABLET ORAL
Qty: 90 TABLET | Refills: 1 | OUTPATIENT
Start: 2020-11-11

## 2020-12-30 ENCOUNTER — OFFICE VISIT (OUTPATIENT)
Dept: FAMILY MEDICINE CLINIC | Facility: CLINIC | Age: 70
End: 2020-12-30
Payer: COMMERCIAL

## 2020-12-30 ENCOUNTER — LABORATORY ENCOUNTER (OUTPATIENT)
Dept: LAB | Age: 70
End: 2020-12-30
Attending: FAMILY MEDICINE
Payer: COMMERCIAL

## 2020-12-30 VITALS
DIASTOLIC BLOOD PRESSURE: 80 MMHG | TEMPERATURE: 98 F | HEIGHT: 70 IN | OXYGEN SATURATION: 95 % | SYSTOLIC BLOOD PRESSURE: 130 MMHG | BODY MASS INDEX: 27.81 KG/M2 | RESPIRATION RATE: 12 BRPM | HEART RATE: 60 BPM | WEIGHT: 194.25 LBS

## 2020-12-30 DIAGNOSIS — R73.9 HYPERGLYCEMIA: ICD-10-CM

## 2020-12-30 DIAGNOSIS — I10 ESSENTIAL HYPERTENSION: ICD-10-CM

## 2020-12-30 DIAGNOSIS — Z12.11 COLON CANCER SCREENING: Primary | ICD-10-CM

## 2020-12-30 DIAGNOSIS — R63.5 WEIGHT GAIN: ICD-10-CM

## 2020-12-30 PROCEDURE — 99214 OFFICE O/P EST MOD 30 MIN: CPT | Performed by: FAMILY MEDICINE

## 2020-12-30 PROCEDURE — 3075F SYST BP GE 130 - 139MM HG: CPT | Performed by: FAMILY MEDICINE

## 2020-12-30 PROCEDURE — 3079F DIAST BP 80-89 MM HG: CPT | Performed by: FAMILY MEDICINE

## 2020-12-30 PROCEDURE — 3008F BODY MASS INDEX DOCD: CPT | Performed by: FAMILY MEDICINE

## 2020-12-30 NOTE — PROGRESS NOTES
HPI:    Patient ID: Valeria Rogers is a 65year old male. Pt doing well  Breathing comfortable  Some wt gain    HPI    Review of Systems   Constitutional: Negative for chills and fever. HENT: Negative.     Respiratory: Negative for cough and shortness o

## 2020-12-31 DIAGNOSIS — R73.9 HYPERGLYCEMIA: Primary | ICD-10-CM

## 2020-12-31 DIAGNOSIS — I10 ESSENTIAL HYPERTENSION: ICD-10-CM

## 2021-01-04 ENCOUNTER — TELEPHONE (OUTPATIENT)
Dept: FAMILY MEDICINE CLINIC | Facility: CLINIC | Age: 71
End: 2021-01-04

## 2021-01-04 NOTE — TELEPHONE ENCOUNTER
Spoke with pt as a request from dr. Charli Zelaya and scheduled appt  To complete paperwork by dr. Charli Zelaya.

## 2021-01-18 ENCOUNTER — OFFICE VISIT (OUTPATIENT)
Dept: FAMILY MEDICINE CLINIC | Facility: CLINIC | Age: 71
End: 2021-01-18
Payer: COMMERCIAL

## 2021-01-18 VITALS
TEMPERATURE: 98 F | WEIGHT: 191 LBS | SYSTOLIC BLOOD PRESSURE: 138 MMHG | HEART RATE: 60 BPM | BODY MASS INDEX: 27.35 KG/M2 | RESPIRATION RATE: 12 BRPM | DIASTOLIC BLOOD PRESSURE: 80 MMHG | OXYGEN SATURATION: 95 % | HEIGHT: 70 IN

## 2021-01-18 DIAGNOSIS — I10 ESSENTIAL HYPERTENSION: ICD-10-CM

## 2021-01-18 DIAGNOSIS — Z72.0 TOBACCO ABUSE: ICD-10-CM

## 2021-01-18 DIAGNOSIS — R73.9 HYPERGLYCEMIA: ICD-10-CM

## 2021-01-18 DIAGNOSIS — Z00.00 ROUTINE HEALTH MAINTENANCE: Primary | ICD-10-CM

## 2021-01-18 PROCEDURE — 3008F BODY MASS INDEX DOCD: CPT | Performed by: FAMILY MEDICINE

## 2021-01-18 PROCEDURE — 3079F DIAST BP 80-89 MM HG: CPT | Performed by: FAMILY MEDICINE

## 2021-01-18 PROCEDURE — 3075F SYST BP GE 130 - 139MM HG: CPT | Performed by: FAMILY MEDICINE

## 2021-01-18 PROCEDURE — 99397 PER PM REEVAL EST PAT 65+ YR: CPT | Performed by: FAMILY MEDICINE

## 2021-01-18 NOTE — PROGRESS NOTES
Kirk Ortega is a 66year old male. Patient presents with:  Paperwork      HPI:   [address] -flight physical.  Patient feeling good. Without problems with medications. Regular activity without difficulties. Continues to work daily in [facility].     • distress  SKIN: no rashes,no suspicious lesions  HEENT: atraumatic, normocephalic, R TM normal, L TM normal, Pharynx normal  NECK: supple, no cervical adenopathy  LUNGS: clear to auscultation  CARDIO: RRR without murmur  ABD soft, nontender, normal BS, no

## 2021-02-02 ENCOUNTER — TELEPHONE (OUTPATIENT)
Dept: FAMILY MEDICINE CLINIC | Facility: CLINIC | Age: 71
End: 2021-02-02

## 2021-03-08 DIAGNOSIS — Z23 NEED FOR VACCINATION: ICD-10-CM

## 2021-05-04 DIAGNOSIS — I10 ESSENTIAL HYPERTENSION: ICD-10-CM

## 2021-05-04 RX ORDER — METOPROLOL TARTRATE 100 MG/1
TABLET ORAL
Qty: 90 TABLET | Refills: 1 | Status: SHIPPED | OUTPATIENT
Start: 2021-05-04 | End: 2021-11-03

## 2021-05-04 NOTE — TELEPHONE ENCOUNTER
Last refill: 11/05/20  Qty:  90  w 1 refills  Last ov: 01/18;21    Requested Prescriptions     Pending Prescriptions Disp Refills   • METOPROLOL TARTRATE 100 MG Oral Tab [Pharmacy Med Name: METOPROLOL TARTRATE 100MG TABLETS] 90 tablet 1     Sig: TAKE 1 TAB

## 2021-11-03 ENCOUNTER — LAB ENCOUNTER (OUTPATIENT)
Dept: LAB | Age: 71
End: 2021-11-03
Attending: FAMILY MEDICINE
Payer: MEDICARE

## 2021-11-03 DIAGNOSIS — R73.9 HYPERGLYCEMIA: ICD-10-CM

## 2021-11-03 DIAGNOSIS — I10 ESSENTIAL HYPERTENSION: ICD-10-CM

## 2021-11-03 PROCEDURE — 83036 HEMOGLOBIN GLYCOSYLATED A1C: CPT

## 2021-11-03 PROCEDURE — 80061 LIPID PANEL: CPT

## 2021-11-03 PROCEDURE — 80053 COMPREHEN METABOLIC PANEL: CPT

## 2021-11-03 PROCEDURE — 36415 COLL VENOUS BLD VENIPUNCTURE: CPT

## 2021-11-03 RX ORDER — METOPROLOL TARTRATE 100 MG/1
TABLET ORAL
Qty: 90 TABLET | Refills: 1 | Status: SHIPPED | OUTPATIENT
Start: 2021-11-03

## 2021-11-03 NOTE — TELEPHONE ENCOUNTER
Last office visit: 1/18/21  Last refill: 5/4/21  Labs: In process  No future appointments.    Name from pharmacy: METOPROLOL TARTRATE 100MG TABLETS         Will file in chart as: METOPROLOL TARTRATE 100 MG Oral Tab    Sig: TAKE 1 TABLET(100 MG) BY MOUTH ANTONIO

## 2022-01-31 ENCOUNTER — TELEPHONE (OUTPATIENT)
Dept: FAMILY MEDICINE CLINIC | Facility: CLINIC | Age: 72
End: 2022-01-31

## 2022-03-02 ENCOUNTER — OFFICE VISIT (OUTPATIENT)
Dept: FAMILY MEDICINE CLINIC | Facility: CLINIC | Age: 72
End: 2022-03-02
Payer: MEDICARE

## 2022-03-02 ENCOUNTER — LABORATORY ENCOUNTER (OUTPATIENT)
Dept: LAB | Age: 72
End: 2022-03-02
Attending: FAMILY MEDICINE
Payer: MEDICARE

## 2022-03-02 VITALS
WEIGHT: 201 LBS | SYSTOLIC BLOOD PRESSURE: 138 MMHG | HEART RATE: 76 BPM | HEIGHT: 71 IN | DIASTOLIC BLOOD PRESSURE: 82 MMHG | BODY MASS INDEX: 28.14 KG/M2 | RESPIRATION RATE: 12 BRPM | TEMPERATURE: 98 F

## 2022-03-02 DIAGNOSIS — N18.2 TYPE 2 DIABETES MELLITUS WITH STAGE 2 CHRONIC KIDNEY DISEASE, WITHOUT LONG-TERM CURRENT USE OF INSULIN (HCC): ICD-10-CM

## 2022-03-02 DIAGNOSIS — J41.0 SMOKERS' COUGH (HCC): ICD-10-CM

## 2022-03-02 DIAGNOSIS — J44.9 COPD, MILD (HCC): ICD-10-CM

## 2022-03-02 DIAGNOSIS — R09.89 RIGHT CAROTID BRUIT: ICD-10-CM

## 2022-03-02 DIAGNOSIS — Z12.11 COLON CANCER SCREENING: ICD-10-CM

## 2022-03-02 DIAGNOSIS — E11.22 TYPE 2 DIABETES MELLITUS WITH STAGE 2 CHRONIC KIDNEY DISEASE, WITHOUT LONG-TERM CURRENT USE OF INSULIN (HCC): ICD-10-CM

## 2022-03-02 DIAGNOSIS — E11.65 TYPE 2 DIABETES MELLITUS WITH HYPERGLYCEMIA, WITHOUT LONG-TERM CURRENT USE OF INSULIN (HCC): ICD-10-CM

## 2022-03-02 DIAGNOSIS — R63.5 WEIGHT GAIN: ICD-10-CM

## 2022-03-02 DIAGNOSIS — R73.9 HYPERGLYCEMIA: ICD-10-CM

## 2022-03-02 DIAGNOSIS — I10 ESSENTIAL HYPERTENSION: Primary | ICD-10-CM

## 2022-03-02 DIAGNOSIS — Z72.0 TOBACCO ABUSE: ICD-10-CM

## 2022-03-02 DIAGNOSIS — Z00.00 ENCOUNTER FOR ANNUAL HEALTH EXAMINATION: ICD-10-CM

## 2022-03-02 PROBLEM — E11.9 DIABETES MELLITUS, TYPE 2 (HCC): Chronic | Status: ACTIVE | Noted: 2022-03-02

## 2022-03-02 LAB
ALBUMIN SERPL-MCNC: 4.1 G/DL (ref 3.4–5)
ALBUMIN/GLOB SERPL: 1.2 {RATIO} (ref 1–2)
ALP LIVER SERPL-CCNC: 73 U/L
ALT SERPL-CCNC: 33 U/L
ANION GAP SERPL CALC-SCNC: 7 MMOL/L (ref 0–18)
AST SERPL-CCNC: 17 U/L (ref 15–37)
BILIRUB SERPL-MCNC: 0.4 MG/DL (ref 0.1–2)
BUN BLD-MCNC: 12 MG/DL (ref 7–18)
CALCIUM BLD-MCNC: 10 MG/DL (ref 8.5–10.1)
CHLORIDE SERPL-SCNC: 106 MMOL/L (ref 98–112)
CHOLEST SERPL-MCNC: 161 MG/DL (ref ?–200)
CO2 SERPL-SCNC: 25 MMOL/L (ref 21–32)
CREAT BLD-MCNC: 0.83 MG/DL
EST. AVERAGE GLUCOSE BLD GHB EST-MCNC: 151 MG/DL (ref 68–126)
GLOBULIN PLAS-MCNC: 3.4 G/DL (ref 2.8–4.4)
GLUCOSE BLD-MCNC: 158 MG/DL (ref 70–99)
HBA1C MFR BLD: 6.9 % (ref ?–5.7)
HDLC SERPL-MCNC: 34 MG/DL (ref 40–59)
LDLC SERPL CALC-MCNC: 97 MG/DL (ref ?–100)
NONHDLC SERPL-MCNC: 127 MG/DL (ref ?–130)
OSMOLALITY SERPL CALC.SUM OF ELEC: 289 MOSM/KG (ref 275–295)
POTASSIUM SERPL-SCNC: 4.9 MMOL/L (ref 3.5–5.1)
PROT SERPL-MCNC: 7.5 G/DL (ref 6.4–8.2)
SODIUM SERPL-SCNC: 138 MMOL/L (ref 136–145)
TRIGL SERPL-MCNC: 169 MG/DL (ref 30–149)
VLDLC SERPL CALC-MCNC: 28 MG/DL (ref 0–30)

## 2022-03-02 PROCEDURE — 3008F BODY MASS INDEX DOCD: CPT | Performed by: FAMILY MEDICINE

## 2022-03-02 PROCEDURE — 99397 PER PM REEVAL EST PAT 65+ YR: CPT | Performed by: FAMILY MEDICINE

## 2022-03-02 PROCEDURE — 3079F DIAST BP 80-89 MM HG: CPT | Performed by: FAMILY MEDICINE

## 2022-03-02 PROCEDURE — 3075F SYST BP GE 130 - 139MM HG: CPT | Performed by: FAMILY MEDICINE

## 2022-03-02 PROCEDURE — G0402 INITIAL PREVENTIVE EXAM: HCPCS | Performed by: FAMILY MEDICINE

## 2022-03-02 PROCEDURE — 96160 PT-FOCUSED HLTH RISK ASSMT: CPT | Performed by: FAMILY MEDICINE

## 2022-03-03 ENCOUNTER — PATIENT MESSAGE (OUTPATIENT)
Dept: FAMILY MEDICINE CLINIC | Facility: CLINIC | Age: 72
End: 2022-03-03

## 2022-03-03 RX ORDER — METFORMIN HYDROCHLORIDE 500 MG/1
500 TABLET, EXTENDED RELEASE ORAL
Qty: 90 TABLET | Refills: 0 | Status: SHIPPED | OUTPATIENT
Start: 2022-03-03

## 2022-03-03 NOTE — TELEPHONE ENCOUNTER
From: Viki Rasmussen  To: Daniella Mike  Sent: 3/3/2022 10:14 AM CST  Subject: medication    Good morning,   Doctor is recommending taking Metformin; which pharmacy do you want it sent to?  Please respond, Esa Way RN

## 2022-05-06 RX ORDER — METOPROLOL TARTRATE 100 MG/1
100 TABLET ORAL DAILY
Qty: 90 TABLET | Refills: 1 | Status: SHIPPED | OUTPATIENT
Start: 2022-05-06

## 2022-06-02 DIAGNOSIS — R73.9 HYPERGLYCEMIA: ICD-10-CM

## 2022-06-02 RX ORDER — METFORMIN HYDROCHLORIDE 500 MG/1
500 TABLET, EXTENDED RELEASE ORAL
Qty: 90 TABLET | Refills: 0 | Status: SHIPPED | OUTPATIENT
Start: 2022-06-02

## 2022-08-17 ENCOUNTER — LABORATORY ENCOUNTER (OUTPATIENT)
Dept: LAB | Age: 72
End: 2022-08-17
Attending: FAMILY MEDICINE
Payer: MEDICARE

## 2022-08-17 DIAGNOSIS — I10 ESSENTIAL HYPERTENSION: ICD-10-CM

## 2022-08-17 DIAGNOSIS — R73.9 HYPERGLYCEMIA: ICD-10-CM

## 2022-08-17 LAB
ALBUMIN SERPL-MCNC: 3.5 G/DL (ref 3.4–5)
ALBUMIN/GLOB SERPL: 1 {RATIO} (ref 1–2)
ALP LIVER SERPL-CCNC: 65 U/L
ALT SERPL-CCNC: 27 U/L
ANION GAP SERPL CALC-SCNC: 3 MMOL/L (ref 0–18)
AST SERPL-CCNC: 17 U/L (ref 15–37)
BILIRUB SERPL-MCNC: 0.4 MG/DL (ref 0.1–2)
BUN BLD-MCNC: 12 MG/DL (ref 7–18)
CALCIUM BLD-MCNC: 9.8 MG/DL (ref 8.5–10.1)
CHLORIDE SERPL-SCNC: 108 MMOL/L (ref 98–112)
CO2 SERPL-SCNC: 25 MMOL/L (ref 21–32)
CREAT BLD-MCNC: 0.92 MG/DL
EST. AVERAGE GLUCOSE BLD GHB EST-MCNC: 140 MG/DL (ref 68–126)
FASTING STATUS PATIENT QL REPORTED: YES
GFR SERPLBLD BASED ON 1.73 SQ M-ARVRAT: 87 ML/MIN/1.73M2 (ref 60–?)
GLOBULIN PLAS-MCNC: 3.5 G/DL (ref 2.8–4.4)
GLUCOSE BLD-MCNC: 157 MG/DL (ref 70–99)
HBA1C MFR BLD: 6.5 % (ref ?–5.7)
OSMOLALITY SERPL CALC.SUM OF ELEC: 285 MOSM/KG (ref 275–295)
POTASSIUM SERPL-SCNC: 4.4 MMOL/L (ref 3.5–5.1)
PROT SERPL-MCNC: 7 G/DL (ref 6.4–8.2)
SODIUM SERPL-SCNC: 136 MMOL/L (ref 136–145)

## 2022-08-17 PROCEDURE — 36415 COLL VENOUS BLD VENIPUNCTURE: CPT

## 2022-08-17 PROCEDURE — 83036 HEMOGLOBIN GLYCOSYLATED A1C: CPT

## 2022-08-17 PROCEDURE — 80053 COMPREHEN METABOLIC PANEL: CPT

## 2022-08-17 PROCEDURE — 3044F HG A1C LEVEL LT 7.0%: CPT | Performed by: FAMILY MEDICINE

## 2022-08-18 DIAGNOSIS — Z00.00 ENCOUNTER FOR ANNUAL HEALTH EXAMINATION: ICD-10-CM

## 2022-08-18 DIAGNOSIS — N18.2 TYPE 2 DIABETES MELLITUS WITH STAGE 2 CHRONIC KIDNEY DISEASE, WITHOUT LONG-TERM CURRENT USE OF INSULIN (HCC): ICD-10-CM

## 2022-08-18 DIAGNOSIS — E11.22 TYPE 2 DIABETES MELLITUS WITH STAGE 2 CHRONIC KIDNEY DISEASE, WITHOUT LONG-TERM CURRENT USE OF INSULIN (HCC): ICD-10-CM

## 2022-08-18 DIAGNOSIS — I10 PRIMARY HYPERTENSION: Primary | ICD-10-CM

## 2022-08-18 DIAGNOSIS — E11.65 TYPE 2 DIABETES MELLITUS WITH HYPERGLYCEMIA, WITHOUT LONG-TERM CURRENT USE OF INSULIN (HCC): ICD-10-CM

## 2022-09-03 DIAGNOSIS — R73.9 HYPERGLYCEMIA: ICD-10-CM

## 2022-09-03 RX ORDER — METFORMIN HYDROCHLORIDE 500 MG/1
500 TABLET, EXTENDED RELEASE ORAL
Qty: 90 TABLET | Refills: 0 | Status: SHIPPED | OUTPATIENT
Start: 2022-09-03

## 2022-09-03 NOTE — TELEPHONE ENCOUNTER
Last office visit: 03/02/22  Last refill:  06/02/22  #90,no refills  Last a1c:  08/17/22  Last micro: 06/25/20    No future appointments-sending mychart-pt due for 6 month follow up     Diabetic Medication Protocol Failed 09/03/2022 09:24 AM   Protocol Details  Microalbumin procedure in past 12 months or taking ACE/ARB    Appointment in past 6 or next 3 months    HgBA1C procedure resulted in past 6 months    Last HgBA1C < 7.5

## 2022-09-12 ENCOUNTER — OFFICE VISIT (OUTPATIENT)
Dept: FAMILY MEDICINE CLINIC | Facility: CLINIC | Age: 72
End: 2022-09-12
Payer: MEDICARE

## 2022-09-12 VITALS
TEMPERATURE: 97 F | DIASTOLIC BLOOD PRESSURE: 60 MMHG | OXYGEN SATURATION: 98 % | BODY MASS INDEX: 28.7 KG/M2 | HEIGHT: 71 IN | WEIGHT: 205 LBS | HEART RATE: 64 BPM | SYSTOLIC BLOOD PRESSURE: 118 MMHG

## 2022-09-12 DIAGNOSIS — Z72.0 TOBACCO ABUSE: ICD-10-CM

## 2022-09-12 DIAGNOSIS — J44.9 COPD, MILD (HCC): ICD-10-CM

## 2022-09-12 DIAGNOSIS — I10 ESSENTIAL HYPERTENSION: Primary | ICD-10-CM

## 2022-09-12 DIAGNOSIS — E11.65 TYPE 2 DIABETES MELLITUS WITH HYPERGLYCEMIA, WITHOUT LONG-TERM CURRENT USE OF INSULIN (HCC): ICD-10-CM

## 2022-09-12 PROCEDURE — 3008F BODY MASS INDEX DOCD: CPT | Performed by: FAMILY MEDICINE

## 2022-09-12 PROCEDURE — 3074F SYST BP LT 130 MM HG: CPT | Performed by: FAMILY MEDICINE

## 2022-09-12 PROCEDURE — 99214 OFFICE O/P EST MOD 30 MIN: CPT | Performed by: FAMILY MEDICINE

## 2022-09-12 PROCEDURE — 3078F DIAST BP <80 MM HG: CPT | Performed by: FAMILY MEDICINE

## 2022-09-12 NOTE — PATIENT INSTRUCTIONS
Continue medications as directed. Encouraged 10 pound weight loss. Diet, exercise. Call with questions or problems.

## 2022-11-03 ENCOUNTER — TELEPHONE (OUTPATIENT)
Dept: FAMILY MEDICINE CLINIC | Facility: CLINIC | Age: 72
End: 2022-11-03

## 2022-11-03 DIAGNOSIS — I10 ESSENTIAL HYPERTENSION: ICD-10-CM

## 2022-11-03 RX ORDER — METOPROLOL TARTRATE 100 MG/1
100 TABLET ORAL DAILY
Qty: 90 TABLET | Refills: 1 | Status: SHIPPED | OUTPATIENT
Start: 2022-11-03

## 2022-12-05 DIAGNOSIS — R73.9 HYPERGLYCEMIA: ICD-10-CM

## 2022-12-05 RX ORDER — METFORMIN HYDROCHLORIDE 500 MG/1
500 TABLET, EXTENDED RELEASE ORAL
Qty: 90 TABLET | Refills: 0 | Status: SHIPPED | OUTPATIENT
Start: 2022-12-05

## 2022-12-05 NOTE — TELEPHONE ENCOUNTER
Diabetic Medication Protocol Failed 12/05/2022 04:04 PM   Protocol Details  Microalbumin procedure in past 12 months or taking ACE/ARB    HgBA1C procedure resulted in past 6 months    Last HgBA1C < 7.5    Appointment in past 6 or next 3 months     Last OV 9/12/22  Last refill 9/3/22 90 0 refill  No future appointments.

## 2023-03-08 ENCOUNTER — TELEPHONE (OUTPATIENT)
Dept: FAMILY MEDICINE CLINIC | Facility: CLINIC | Age: 73
End: 2023-03-08

## 2023-03-08 DIAGNOSIS — R73.9 HYPERGLYCEMIA: ICD-10-CM

## 2023-03-08 RX ORDER — METFORMIN HYDROCHLORIDE 500 MG/1
500 TABLET, EXTENDED RELEASE ORAL
Qty: 90 TABLET | Refills: 0 | Status: SHIPPED | OUTPATIENT
Start: 2023-03-08

## 2023-03-08 NOTE — TELEPHONE ENCOUNTER
metFORMIN  MG Oral Tablet 24 Hr   Pt. Needs refill called to [facility]. Pt. Did schedule wellness visit and fasting labs the end of the month.

## 2023-03-28 ENCOUNTER — OFFICE VISIT (OUTPATIENT)
Dept: FAMILY MEDICINE CLINIC | Facility: CLINIC | Age: 73
End: 2023-03-28
Payer: MEDICARE

## 2023-03-28 ENCOUNTER — LABORATORY ENCOUNTER (OUTPATIENT)
Dept: LAB | Age: 73
End: 2023-03-28
Attending: FAMILY MEDICINE
Payer: MEDICARE

## 2023-03-28 VITALS
HEART RATE: 60 BPM | WEIGHT: 192 LBS | RESPIRATION RATE: 12 BRPM | OXYGEN SATURATION: 98 % | DIASTOLIC BLOOD PRESSURE: 80 MMHG | TEMPERATURE: 98 F | BODY MASS INDEX: 26.88 KG/M2 | HEIGHT: 71 IN | SYSTOLIC BLOOD PRESSURE: 120 MMHG

## 2023-03-28 DIAGNOSIS — Z00.00 ENCOUNTER FOR ANNUAL HEALTH EXAMINATION: Primary | ICD-10-CM

## 2023-03-28 DIAGNOSIS — Z12.11 COLON CANCER SCREENING: ICD-10-CM

## 2023-03-28 DIAGNOSIS — Z72.0 TOBACCO ABUSE: ICD-10-CM

## 2023-03-28 DIAGNOSIS — I10 PRIMARY HYPERTENSION: ICD-10-CM

## 2023-03-28 DIAGNOSIS — J41.0 SMOKERS' COUGH (HCC): Chronic | ICD-10-CM

## 2023-03-28 DIAGNOSIS — E11.22 TYPE 2 DIABETES MELLITUS WITH STAGE 2 CHRONIC KIDNEY DISEASE, WITHOUT LONG-TERM CURRENT USE OF INSULIN (HCC): ICD-10-CM

## 2023-03-28 DIAGNOSIS — E11.65 TYPE 2 DIABETES MELLITUS WITH HYPERGLYCEMIA, WITHOUT LONG-TERM CURRENT USE OF INSULIN (HCC): ICD-10-CM

## 2023-03-28 DIAGNOSIS — N18.2 TYPE 2 DIABETES MELLITUS WITH STAGE 2 CHRONIC KIDNEY DISEASE, WITHOUT LONG-TERM CURRENT USE OF INSULIN (HCC): ICD-10-CM

## 2023-03-28 DIAGNOSIS — J44.9 COPD, MILD (HCC): ICD-10-CM

## 2023-03-28 LAB
ALBUMIN SERPL-MCNC: 3.7 G/DL (ref 3.4–5)
ALBUMIN/GLOB SERPL: 1.1 {RATIO} (ref 1–2)
ALP LIVER SERPL-CCNC: 73 U/L
ALT SERPL-CCNC: 33 U/L
ANION GAP SERPL CALC-SCNC: 2 MMOL/L (ref 0–18)
AST SERPL-CCNC: 17 U/L (ref 15–37)
BILIRUB SERPL-MCNC: 0.4 MG/DL (ref 0.1–2)
BUN BLD-MCNC: 11 MG/DL (ref 7–18)
CALCIUM BLD-MCNC: 9.3 MG/DL (ref 8.5–10.1)
CHLORIDE SERPL-SCNC: 111 MMOL/L (ref 98–112)
CHOLEST SERPL-MCNC: 159 MG/DL (ref ?–200)
CO2 SERPL-SCNC: 24 MMOL/L (ref 21–32)
CREAT BLD-MCNC: 0.92 MG/DL
EST. AVERAGE GLUCOSE BLD GHB EST-MCNC: 157 MG/DL (ref 68–126)
FASTING PATIENT LIPID ANSWER: YES
FASTING STATUS PATIENT QL REPORTED: YES
GFR SERPLBLD BASED ON 1.73 SQ M-ARVRAT: 87 ML/MIN/1.73M2 (ref 60–?)
GLOBULIN PLAS-MCNC: 3.5 G/DL (ref 2.8–4.4)
GLUCOSE BLD-MCNC: 160 MG/DL (ref 70–99)
HBA1C MFR BLD: 7.1 % (ref ?–5.7)
HDLC SERPL-MCNC: 33 MG/DL (ref 40–59)
LDLC SERPL CALC-MCNC: 86 MG/DL (ref ?–100)
NONHDLC SERPL-MCNC: 126 MG/DL (ref ?–130)
OSMOLALITY SERPL CALC.SUM OF ELEC: 287 MOSM/KG (ref 275–295)
POTASSIUM SERPL-SCNC: 4.7 MMOL/L (ref 3.5–5.1)
PROT SERPL-MCNC: 7.2 G/DL (ref 6.4–8.2)
SODIUM SERPL-SCNC: 137 MMOL/L (ref 136–145)
TRIGL SERPL-MCNC: 235 MG/DL (ref 30–149)
VLDLC SERPL CALC-MCNC: 38 MG/DL (ref 0–30)

## 2023-03-28 PROCEDURE — G0438 PPPS, INITIAL VISIT: HCPCS | Performed by: FAMILY MEDICINE

## 2023-03-28 PROCEDURE — 96160 PT-FOCUSED HLTH RISK ASSMT: CPT | Performed by: FAMILY MEDICINE

## 2023-03-28 PROCEDURE — 3008F BODY MASS INDEX DOCD: CPT | Performed by: FAMILY MEDICINE

## 2023-03-28 PROCEDURE — 3074F SYST BP LT 130 MM HG: CPT | Performed by: FAMILY MEDICINE

## 2023-03-28 PROCEDURE — 1125F AMNT PAIN NOTED PAIN PRSNT: CPT | Performed by: FAMILY MEDICINE

## 2023-03-28 PROCEDURE — 83036 HEMOGLOBIN GLYCOSYLATED A1C: CPT

## 2023-03-28 PROCEDURE — 3079F DIAST BP 80-89 MM HG: CPT | Performed by: FAMILY MEDICINE

## 2023-03-29 DIAGNOSIS — Z00.00 ENCOUNTER FOR ANNUAL HEALTH EXAMINATION: Primary | ICD-10-CM

## 2023-03-29 DIAGNOSIS — E11.65 TYPE 2 DIABETES MELLITUS WITH HYPERGLYCEMIA, WITHOUT LONG-TERM CURRENT USE OF INSULIN (HCC): Primary | ICD-10-CM

## 2023-05-02 DIAGNOSIS — I10 ESSENTIAL HYPERTENSION: ICD-10-CM

## 2023-05-02 RX ORDER — METOPROLOL TARTRATE 100 MG/1
TABLET ORAL
Qty: 90 TABLET | Refills: 0 | Status: SHIPPED | OUTPATIENT
Start: 2023-05-02

## 2023-05-15 ENCOUNTER — OFFICE VISIT (OUTPATIENT)
Dept: FAMILY MEDICINE CLINIC | Facility: CLINIC | Age: 73
End: 2023-05-15
Payer: MEDICARE

## 2023-05-15 VITALS
HEART RATE: 75 BPM | HEIGHT: 71 IN | WEIGHT: 191 LBS | DIASTOLIC BLOOD PRESSURE: 72 MMHG | SYSTOLIC BLOOD PRESSURE: 138 MMHG | RESPIRATION RATE: 19 BRPM | BODY MASS INDEX: 26.74 KG/M2 | TEMPERATURE: 98 F | OXYGEN SATURATION: 99 %
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
## 2023-05-15 PROBLEM — S12.401D CLOSED NONDISPLACED FRACTURE OF FIFTH CERVICAL VERTEBRA WITH ROUTINE HEALING: Status: ACTIVE | Noted: 2019-05-03

## 2023-05-15 PROBLEM — S19.9XXD: Status: ACTIVE | Noted: 2019-05-03

## 2023-05-15 RX ORDER — AMOXICILLIN AND CLAVULANATE POTASSIUM 875; 125 MG/1; MG/1
1 TABLET, FILM COATED ORAL 2 TIMES DAILY
Qty: 20 TABLET | Refills: 0 | Status: SHIPPED | OUTPATIENT
Start: 2023-05-15 | End: 2023-05-25

## 2023-05-15 RX ORDER — TETANUS AND DIPHTHERIA TOXOIDS ADSORBED 2; 2 [LF]/.5ML; [LF]/.5ML
0.5 INJECTION INTRAMUSCULAR ONCE
Qty: 0.5 ML | Refills: 0 | Status: SHIPPED | OUTPATIENT
Start: 2023-05-15 | End: 2023-05-15 | Stop reason: CLARIF

## 2023-06-01 DIAGNOSIS — R73.9 HYPERGLYCEMIA: ICD-10-CM

## 2023-06-01 RX ORDER — METFORMIN HYDROCHLORIDE 500 MG/1
TABLET, EXTENDED RELEASE ORAL
Qty: 90 TABLET | Refills: 0 | Status: SHIPPED | OUTPATIENT
Start: 2023-06-01

## 2023-06-01 NOTE — TELEPHONE ENCOUNTER
Metformin  MG oral tab     Diabetic Medication Protocol Failed 06/01/2023 12:33 PM   Protocol Details  Microalbumin procedure in past 12 months or taking ACE/ARB    HgBA1C procedure resulted in past 6 months    Last HgBA1C < 7.5    Appointment in past 6 or next 3 months        Last office visit:  3/28/23    No future appointments.   Last filled:  3/8/23  #90 with 0 refills   Last labs: 3/28/23  HGBA1C:  7.1

## 2023-07-26 DIAGNOSIS — I10 ESSENTIAL HYPERTENSION: ICD-10-CM

## 2023-07-27 RX ORDER — METOPROLOL TARTRATE 100 MG/1
TABLET ORAL
Qty: 90 TABLET | Refills: 0 | Status: SHIPPED | OUTPATIENT
Start: 2023-07-27

## 2023-07-27 NOTE — TELEPHONE ENCOUNTER
METOPROLOL TARTRATE 100 MG Oral Tab     LOV  3-28-23    LAST LAB  3-28-23  Chem Profile  Creatinine 0.92    LAST RX  5-2-23  #90    Next OV  No future appointments.       PROTOCOL  Hypertension Medications Protocol [id number] 03:57 PM   Protocol Details CMP or BMP in past 12 months    Last serum creatinine< 2.0    Appointment in past 6 or next 3 months

## 2023-08-27 DIAGNOSIS — R73.9 HYPERGLYCEMIA: ICD-10-CM

## 2023-08-28 RX ORDER — METFORMIN HYDROCHLORIDE 500 MG/1
TABLET, EXTENDED RELEASE ORAL
Qty: 90 TABLET | Refills: 0 | Status: SHIPPED | OUTPATIENT
Start: 2023-08-28

## 2023-08-28 NOTE — TELEPHONE ENCOUNTER
Last refill: 06/01/23  Qty: 90  W/ 0 refills  Last ov: 03/28/23    Requested Prescriptions     Pending Prescriptions Disp Refills    METFORMIN  MG Oral Tablet 24 Hr [Pharmacy Med Name: METFORMIN ER 500MG 24HR TABS] 90 tablet 0     Sig: TAKE 1 TABLET(500 MG) BY MOUTH DAILY WITH BREAKFAST     No future appointments.

## 2023-09-27 ENCOUNTER — LABORATORY ENCOUNTER (OUTPATIENT)
Dept: LAB | Age: 73
End: 2023-09-27
Attending: FAMILY MEDICINE
Payer: MEDICARE

## 2023-09-27 DIAGNOSIS — E11.65 TYPE 2 DIABETES MELLITUS WITH HYPERGLYCEMIA, WITHOUT LONG-TERM CURRENT USE OF INSULIN (HCC): ICD-10-CM

## 2023-09-27 DIAGNOSIS — Z00.00 ENCOUNTER FOR ANNUAL HEALTH EXAMINATION: ICD-10-CM

## 2023-09-27 LAB
ALBUMIN SERPL-MCNC: 3.9 G/DL (ref 3.4–5)
ALBUMIN/GLOB SERPL: 1 {RATIO} (ref 1–2)
ALP LIVER SERPL-CCNC: 80 U/L
ALT SERPL-CCNC: 21 U/L
ANION GAP SERPL CALC-SCNC: 3 MMOL/L (ref 0–18)
AST SERPL-CCNC: 10 U/L (ref 15–37)
BILIRUB SERPL-MCNC: 0.5 MG/DL (ref 0.1–2)
BUN BLD-MCNC: 12 MG/DL (ref 7–18)
CALCIUM BLD-MCNC: 9.8 MG/DL (ref 8.5–10.1)
CHLORIDE SERPL-SCNC: 105 MMOL/L (ref 98–112)
CHOLEST SERPL-MCNC: 154 MG/DL (ref ?–200)
CO2 SERPL-SCNC: 28 MMOL/L (ref 21–32)
CREAT BLD-MCNC: 0.86 MG/DL
EGFRCR SERPLBLD CKD-EPI 2021: 90 ML/MIN/1.73M2 (ref 60–?)
EST. AVERAGE GLUCOSE BLD GHB EST-MCNC: 134 MG/DL (ref 68–126)
FASTING PATIENT LIPID ANSWER: YES
FASTING STATUS PATIENT QL REPORTED: YES
GLOBULIN PLAS-MCNC: 4 G/DL (ref 2.8–4.4)
GLUCOSE BLD-MCNC: 131 MG/DL (ref 70–99)
HBA1C MFR BLD: 6.3 % (ref ?–5.7)
HDLC SERPL-MCNC: 34 MG/DL (ref 40–59)
LDLC SERPL CALC-MCNC: 96 MG/DL (ref ?–100)
NONHDLC SERPL-MCNC: 120 MG/DL (ref ?–130)
OSMOLALITY SERPL CALC.SUM OF ELEC: 284 MOSM/KG (ref 275–295)
POTASSIUM SERPL-SCNC: 4.7 MMOL/L (ref 3.5–5.1)
PROT SERPL-MCNC: 7.9 G/DL (ref 6.4–8.2)
SODIUM SERPL-SCNC: 136 MMOL/L (ref 136–145)
TRIGL SERPL-MCNC: 135 MG/DL (ref 30–149)
VLDLC SERPL CALC-MCNC: 22 MG/DL (ref 0–30)

## 2023-09-27 PROCEDURE — 3044F HG A1C LEVEL LT 7.0%: CPT | Performed by: PHYSICIAN ASSISTANT

## 2023-09-27 PROCEDURE — 83036 HEMOGLOBIN GLYCOSYLATED A1C: CPT

## 2023-09-27 PROCEDURE — 80053 COMPREHEN METABOLIC PANEL: CPT

## 2023-09-27 PROCEDURE — 36415 COLL VENOUS BLD VENIPUNCTURE: CPT

## 2023-09-27 PROCEDURE — 80061 LIPID PANEL: CPT

## 2023-09-28 ENCOUNTER — OFFICE VISIT (OUTPATIENT)
Dept: FAMILY MEDICINE CLINIC | Facility: CLINIC | Age: 73
End: 2023-09-28
Payer: MEDICARE

## 2023-09-28 VITALS
BODY MASS INDEX: 24.92 KG/M2 | DIASTOLIC BLOOD PRESSURE: 70 MMHG | HEIGHT: 71 IN | TEMPERATURE: 97 F | WEIGHT: 178 LBS | OXYGEN SATURATION: 96 % | RESPIRATION RATE: 16 BRPM | HEART RATE: 64 BPM | SYSTOLIC BLOOD PRESSURE: 120 MMHG

## 2023-09-28 DIAGNOSIS — I10 ESSENTIAL HYPERTENSION: Primary | ICD-10-CM

## 2023-09-28 DIAGNOSIS — I10 PRIMARY HYPERTENSION: Primary | ICD-10-CM

## 2023-09-28 DIAGNOSIS — E11.65 TYPE 2 DIABETES MELLITUS WITH HYPERGLYCEMIA, WITHOUT LONG-TERM CURRENT USE OF INSULIN (HCC): ICD-10-CM

## 2023-09-28 DIAGNOSIS — E11.22 TYPE 2 DIABETES MELLITUS WITH STAGE 2 CHRONIC KIDNEY DISEASE, WITHOUT LONG-TERM CURRENT USE OF INSULIN: Chronic | ICD-10-CM

## 2023-09-28 DIAGNOSIS — J44.9 COPD, MILD (HCC): Chronic | ICD-10-CM

## 2023-09-28 DIAGNOSIS — J41.0 SMOKERS' COUGH (HCC): Chronic | ICD-10-CM

## 2023-09-28 DIAGNOSIS — R73.9 HYPERGLYCEMIA: ICD-10-CM

## 2023-09-28 DIAGNOSIS — Z72.0 TOBACCO ABUSE: ICD-10-CM

## 2023-09-28 DIAGNOSIS — N18.2 TYPE 2 DIABETES MELLITUS WITH STAGE 2 CHRONIC KIDNEY DISEASE, WITHOUT LONG-TERM CURRENT USE OF INSULIN: Chronic | ICD-10-CM

## 2023-09-28 PROCEDURE — 1160F RVW MEDS BY RX/DR IN RCRD: CPT | Performed by: FAMILY MEDICINE

## 2023-09-28 PROCEDURE — 1159F MED LIST DOCD IN RCRD: CPT | Performed by: FAMILY MEDICINE

## 2023-09-28 PROCEDURE — 3078F DIAST BP <80 MM HG: CPT | Performed by: FAMILY MEDICINE

## 2023-09-28 PROCEDURE — 3074F SYST BP LT 130 MM HG: CPT | Performed by: FAMILY MEDICINE

## 2023-09-28 PROCEDURE — 3008F BODY MASS INDEX DOCD: CPT | Performed by: FAMILY MEDICINE

## 2023-09-28 PROCEDURE — 1170F FXNL STATUS ASSESSED: CPT | Performed by: FAMILY MEDICINE

## 2023-09-28 PROCEDURE — 99214 OFFICE O/P EST MOD 30 MIN: CPT | Performed by: FAMILY MEDICINE

## 2023-09-28 NOTE — PATIENT INSTRUCTIONS
Continue to monitor blood pressure. Continue with diet, exercise. Continue current medications. Recheck labs in 6 months. Call with questions or problems.

## 2023-10-12 ENCOUNTER — TELEPHONE (OUTPATIENT)
Dept: FAMILY MEDICINE CLINIC | Facility: CLINIC | Age: 73
End: 2023-10-12

## 2023-10-20 DIAGNOSIS — I10 ESSENTIAL HYPERTENSION: ICD-10-CM

## 2023-10-20 RX ORDER — METOPROLOL TARTRATE 100 MG/1
TABLET ORAL
Qty: 90 TABLET | Refills: 0 | Status: SHIPPED | OUTPATIENT
Start: 2023-10-20

## 2023-10-20 NOTE — TELEPHONE ENCOUNTER
Metoprolol Tartrate 100 MG oral tab    Hypertension Medications Protocol [id number]/20/2023 01:05 PM   Protocol Details CMP or BMP in past 12 months    Last serum creatinine< 2.0    Appointment in past 6 or next 3 months        Last office visit:  9/28/23    No future appointments.   Last filled:  7/27/23  #90 with 0 refills   Last labs:   9/27/23  Crea:  0.86

## 2023-11-21 DIAGNOSIS — R73.9 HYPERGLYCEMIA: ICD-10-CM

## 2023-11-21 RX ORDER — METFORMIN HYDROCHLORIDE 500 MG/1
TABLET, EXTENDED RELEASE ORAL
Qty: 90 TABLET | Refills: 0 | Status: SHIPPED | OUTPATIENT
Start: 2023-11-21

## 2023-11-21 NOTE — TELEPHONE ENCOUNTER
Last refill: 08/28/23  Qty: 90  W/ 0 refills  Last ov: 09/28/23    Requested Prescriptions     Pending Prescriptions Disp Refills    METFORMIN  MG Oral Tablet 24 Hr [Pharmacy Med Name: METFORMIN ER 500MG 24HR TABS] 90 tablet 0     Sig: TAKE 1 TABLET(500 MG) BY MOUTH DAILY WITH BREAKFAST     No future appointments.

## 2023-11-30 ENCOUNTER — OFFICE VISIT (OUTPATIENT)
Dept: FAMILY MEDICINE CLINIC | Facility: CLINIC | Age: 73
End: 2023-11-30
Payer: MEDICARE

## 2023-11-30 VITALS
WEIGHT: 180 LBS | DIASTOLIC BLOOD PRESSURE: 50 MMHG | HEIGHT: 71 IN | TEMPERATURE: 97 F | BODY MASS INDEX: 25.2 KG/M2 | HEART RATE: 65 BPM | OXYGEN SATURATION: 99 % | SYSTOLIC BLOOD PRESSURE: 108 MMHG | RESPIRATION RATE: 18 BRPM

## 2023-11-30 DIAGNOSIS — R05.9 COUGH, UNSPECIFIED TYPE: ICD-10-CM

## 2023-11-30 DIAGNOSIS — U07.1 COVID-19 VIRUS INFECTION: Primary | ICD-10-CM

## 2023-11-30 LAB
OPERATOR ID: ABNORMAL
POCT LOT NUMBER: ABNORMAL
RAPID SARS-COV-2 BY PCR: DETECTED

## 2023-11-30 PROCEDURE — 1160F RVW MEDS BY RX/DR IN RCRD: CPT | Performed by: PHYSICIAN ASSISTANT

## 2023-11-30 PROCEDURE — 3078F DIAST BP <80 MM HG: CPT | Performed by: PHYSICIAN ASSISTANT

## 2023-11-30 PROCEDURE — 99213 OFFICE O/P EST LOW 20 MIN: CPT | Performed by: PHYSICIAN ASSISTANT

## 2023-11-30 PROCEDURE — 3074F SYST BP LT 130 MM HG: CPT | Performed by: PHYSICIAN ASSISTANT

## 2023-11-30 PROCEDURE — U0002 COVID-19 LAB TEST NON-CDC: HCPCS | Performed by: PHYSICIAN ASSISTANT

## 2023-11-30 PROCEDURE — 3008F BODY MASS INDEX DOCD: CPT | Performed by: PHYSICIAN ASSISTANT

## 2023-11-30 PROCEDURE — 1159F MED LIST DOCD IN RCRD: CPT | Performed by: PHYSICIAN ASSISTANT

## 2023-12-01 ENCOUNTER — TELEPHONE (OUTPATIENT)
Dept: FAMILY MEDICINE CLINIC | Facility: CLINIC | Age: 73
End: 2023-12-01

## 2023-12-01 NOTE — TELEPHONE ENCOUNTER
A lot of side effects w/ that med  I would recommend vitamins / vicks/humidifier / honey lemon + not to use that med with his symptoms  Call Monday w/ update

## 2023-12-01 NOTE — TELEPHONE ENCOUNTER
PC to pt. Spoke with pt. Pt states he was seen at [facility] 11/30/23 and tested positive for COVID. Pt states his symptoms originally started on Tuesday, 11/28/23. Current symptoms include: nonproductive cough & slight runny nose. Pt states energy level is OK. Pt denies fevers, chills, severe fatigue, SOB, GI sxs. Pt was prescribed Paxlovid, he will pick this up today. This MA reiterated quarantine procedure. Pt v/u. Pt would like to know if he needs to follow up with Dr. Gregg Ayon? OK to [facility] 12/4/23.

## 2023-12-01 NOTE — TELEPHONE ENCOUNTER
PC to pt. Spoke with pt. Advised of Dr. Douglas Spar comments/recommendations. Pt v/u. Reviewed vitamin regimen with pt. He does not have zinc at home and does not feel comfortable going to the store to pick some up. He does have vitamin d at home that he will take and he has orange juice he will substitute for vitamin c. Pt will not  script and asked if we can cancel it. Advised this MA will call to cancel. Pt v/u. He will call on Monday with an update. PC to [facility]. Spoke with pharmacy tech. Script for paxlovid cancelled.

## 2023-12-04 ENCOUNTER — TELEPHONE (OUTPATIENT)
Dept: FAMILY MEDICINE CLINIC | Facility: CLINIC | Age: 73
End: 2023-12-04

## 2023-12-04 NOTE — TELEPHONE ENCOUNTER
Covid positive vitamin regimen- and other instructions:  Vitamin D3  5000 units po daily  Vitamin C 1000 mg po BID  Zinc 25 mg po BID  Quercetin 250 mg po BID  Xlear Nasal spray 1 to 2 sprays each nostril 4 times a day  Humidity/vaporizor  vicks to chest q HS  Honey 1 tsp po QID, or  In warm tea with lemon   Breathe outside fresh air frequently through out day  Keep self well hydrated,  Rest.  Gargle with mouthwash/A little hydrogen peroxide 4 times a day     Sent to patient in [employer]

## 2024-01-14 DIAGNOSIS — I10 ESSENTIAL HYPERTENSION: ICD-10-CM

## 2024-01-15 RX ORDER — METOPROLOL TARTRATE 100 MG/1
TABLET ORAL
Qty: 90 TABLET | Refills: 0 | Status: SHIPPED | OUTPATIENT
Start: 2024-01-15

## 2024-02-14 DIAGNOSIS — R73.9 HYPERGLYCEMIA: ICD-10-CM

## 2024-02-14 RX ORDER — METFORMIN HYDROCHLORIDE 500 MG/1
TABLET, EXTENDED RELEASE ORAL
Qty: 90 TABLET | Refills: 0 | Status: SHIPPED | OUTPATIENT
Start: 2024-02-14

## 2024-02-14 NOTE — TELEPHONE ENCOUNTER
Last refill: 11/21/23  Qty: 90   W/ 0 refills  Last ov: 09/28/23    Requested Prescriptions     Pending Prescriptions Disp Refills    METFORMIN  MG Oral Tablet 24 Hr [Pharmacy Med Name: METFORMIN ER 500MG 24HR TABS] 90 tablet 0     Sig: TAKE 1 TABLET(500 MG) BY MOUTH DAILY WITH BREAKFAST     No future appointments.

## 2024-03-01 ENCOUNTER — TELEPHONE (OUTPATIENT)
Dept: FAMILY MEDICINE CLINIC | Facility: CLINIC | Age: 74
End: 2024-03-01

## 2024-03-01 NOTE — TELEPHONE ENCOUNTER
Pt is coming in for his supervisit 3/13 and scheduled for labs 3/6 he wants to make sure that Dr has the orders in for labs.

## 2024-03-06 ENCOUNTER — LABORATORY ENCOUNTER (OUTPATIENT)
Dept: LAB | Age: 74
End: 2024-03-06
Attending: FAMILY MEDICINE
Payer: MEDICARE

## 2024-03-06 DIAGNOSIS — I10 PRIMARY HYPERTENSION: ICD-10-CM

## 2024-03-06 DIAGNOSIS — E11.65 TYPE 2 DIABETES MELLITUS WITH HYPERGLYCEMIA, WITHOUT LONG-TERM CURRENT USE OF INSULIN (HCC): ICD-10-CM

## 2024-03-06 DIAGNOSIS — R73.9 HYPERGLYCEMIA: ICD-10-CM

## 2024-03-06 LAB
ALBUMIN SERPL-MCNC: 3.8 G/DL (ref 3.4–5)
ALBUMIN/GLOB SERPL: 1 {RATIO} (ref 1–2)
ALP LIVER SERPL-CCNC: 121 U/L
ALT SERPL-CCNC: 15 U/L
ANION GAP SERPL CALC-SCNC: <0 MMOL/L (ref 0–18)
AST SERPL-CCNC: 14 U/L (ref 15–37)
BILIRUB SERPL-MCNC: 0.4 MG/DL (ref 0.1–2)
BUN BLD-MCNC: 11 MG/DL (ref 9–23)
CALCIUM BLD-MCNC: 9.9 MG/DL (ref 8.5–10.1)
CHLORIDE SERPL-SCNC: 108 MMOL/L (ref 98–112)
CHOLEST SERPL-MCNC: 168 MG/DL (ref ?–200)
CO2 SERPL-SCNC: 28 MMOL/L (ref 21–32)
CREAT BLD-MCNC: 0.99 MG/DL
EGFRCR SERPLBLD CKD-EPI 2021: 79 ML/MIN/1.73M2 (ref 60–?)
EST. AVERAGE GLUCOSE BLD GHB EST-MCNC: 134 MG/DL (ref 68–126)
FASTING PATIENT LIPID ANSWER: YES
FASTING STATUS PATIENT QL REPORTED: YES
GLOBULIN PLAS-MCNC: 3.7 G/DL (ref 2.8–4.4)
GLUCOSE BLD-MCNC: 138 MG/DL (ref 70–99)
HBA1C MFR BLD: 6.3 % (ref ?–5.7)
HDLC SERPL-MCNC: 38 MG/DL (ref 40–59)
LDLC SERPL CALC-MCNC: 106 MG/DL (ref ?–100)
NONHDLC SERPL-MCNC: 130 MG/DL (ref ?–130)
OSMOLALITY SERPL CALC.SUM OF ELEC: 282 MOSM/KG (ref 275–295)
POTASSIUM SERPL-SCNC: 5.2 MMOL/L (ref 3.5–5.1)
PROT SERPL-MCNC: 7.5 G/DL (ref 6.4–8.2)
SODIUM SERPL-SCNC: 135 MMOL/L (ref 136–145)
TRIGL SERPL-MCNC: 131 MG/DL (ref 30–149)
VLDLC SERPL CALC-MCNC: 22 MG/DL (ref 0–30)

## 2024-03-06 PROCEDURE — 83036 HEMOGLOBIN GLYCOSYLATED A1C: CPT

## 2024-03-06 PROCEDURE — 36415 COLL VENOUS BLD VENIPUNCTURE: CPT

## 2024-03-06 PROCEDURE — 80053 COMPREHEN METABOLIC PANEL: CPT

## 2024-03-06 PROCEDURE — 80061 LIPID PANEL: CPT

## 2024-03-07 DIAGNOSIS — I10 PRIMARY HYPERTENSION: ICD-10-CM

## 2024-03-07 DIAGNOSIS — Z00.00 ENCOUNTER FOR ANNUAL HEALTH EXAMINATION: ICD-10-CM

## 2024-03-07 DIAGNOSIS — E11.65 TYPE 2 DIABETES MELLITUS WITH HYPERGLYCEMIA, WITHOUT LONG-TERM CURRENT USE OF INSULIN (HCC): Primary | ICD-10-CM

## 2024-03-19 ENCOUNTER — OFFICE VISIT (OUTPATIENT)
Dept: FAMILY MEDICINE CLINIC | Facility: CLINIC | Age: 74
End: 2024-03-19
Payer: MEDICARE

## 2024-03-19 VITALS
SYSTOLIC BLOOD PRESSURE: 124 MMHG | DIASTOLIC BLOOD PRESSURE: 78 MMHG | TEMPERATURE: 98 F | BODY MASS INDEX: 23.73 KG/M2 | OXYGEN SATURATION: 100 % | RESPIRATION RATE: 16 BRPM | HEIGHT: 71 IN | WEIGHT: 169.5 LBS | HEART RATE: 54 BPM

## 2024-03-19 DIAGNOSIS — J41.0 SMOKERS' COUGH (HCC): Chronic | ICD-10-CM

## 2024-03-19 DIAGNOSIS — I10 ESSENTIAL HYPERTENSION: ICD-10-CM

## 2024-03-19 DIAGNOSIS — Z72.0 TOBACCO ABUSE: ICD-10-CM

## 2024-03-19 DIAGNOSIS — Z00.00 ENCOUNTER FOR ANNUAL HEALTH EXAMINATION: Primary | ICD-10-CM

## 2024-03-19 DIAGNOSIS — E11.22 TYPE 2 DIABETES MELLITUS WITH STAGE 2 CHRONIC KIDNEY DISEASE, WITHOUT LONG-TERM CURRENT USE OF INSULIN (HCC): Chronic | ICD-10-CM

## 2024-03-19 DIAGNOSIS — N18.2 TYPE 2 DIABETES MELLITUS WITH STAGE 2 CHRONIC KIDNEY DISEASE, WITHOUT LONG-TERM CURRENT USE OF INSULIN (HCC): Chronic | ICD-10-CM

## 2024-03-19 DIAGNOSIS — J44.9 COPD, MILD (HCC): Chronic | ICD-10-CM

## 2024-03-19 NOTE — PROGRESS NOTES
Subjective:   Iván Hernandez is a 74 year old male who presents for a MA (Medicare Advantage) Supervisit (Once per calendar year) and scheduled follow up of multiple significant but stable problems.       History/Other:   Fall Risk Assessment:   He has been screened for Falls and is High Risk. Fall Prevention information provided to patient in After Visit Summary.    Do you feel unsteady when standing or walking?: No  Do you worry about falling?: Yes  Have you fallen in the past year?: No     Cognitive Assessment:   He had a completely normal cognitive assessment - see flowsheet entries     Functional Ability/Status:   Iván Hernandez has a completely normal functional assessment. See flowsheet for details.      Depression Screening (PHQ-2/PHQ-9): PHQ-2 SCORE: 0  , done 3/13/2024             Advanced Directives:   He does have a Living Will but we do NOT have it on file in Epic.    He does have a POA but we do NOT have it on file in Epic.    Discussed Advance Care Planning with patient (and family/surrogate if present). Standard forms made available to patient in After Visit Summary.      Patient Active Problem List   Diagnosis    Essential hypertension    Hyperglycemia    Tobacco abuse    Right carotid bruit    H/O cervical fracture,C5    Diabetes mellitus, type 2 (pick complications) (HCC)    Smokers' cough (HCC)    COPD, mild (HCC)    Type 2 diabetes mellitus with hyperglycemia, without long-term current use of insulin (HCC)    Type 2 diabetes mellitus with diabetic chronic kidney disease (HCC)    Closed nondisplaced fracture of fifth cervical vertebra with routine healing    Neck injury, subsequent encounter     Allergies:  He is allergic to eggs or egg-derived products, chocolate, and pollen.    Current Medications:  Outpatient Medications Marked as Taking for the 3/19/24 encounter (Office Visit) with Glenn Gomez DO   Medication Sig    METFORMIN  MG Oral Tablet 24 Hr TAKE 1 TABLET(500 MG) BY MOUTH  DAILY WITH BREAKFAST    METOPROLOL TARTRATE 100 MG Oral Tab TAKE 1 TABLET(100 MG) BY MOUTH IN THE MORNING    Vitamin D3 50 MCG (2000 UT) Oral Cap Take 1 capsule (2,000 Units total) by mouth daily.    aspirin 81 MG Oral Tab EC Take 1 tablet (81 mg total) by mouth daily.       Medical History:  He  has no past medical history on file.  Surgical History:  He  has no past surgical history on file.   Family History:  His family history includes CVA in his mother; Diabetes in his sister; Emphysema in his father.  Social History:  He  reports that he has been smoking cigarettes. He has a 50 pack-year smoking history. He has never used smokeless tobacco. He reports current alcohol use. He reports that he does not use drugs.    Tobacco:  He currently smokes tobacco.  Social History    Tobacco Use      Smoking status: Every Day        Packs/day: 1.00        Years: 50.00        Additional pack years: 0.00        Total pack years: 50.00        Types: Cigarettes      Smokeless tobacco: Never     Tobacco Cessation Documentation (Smoking and Smokeless included):  I had an in depth therapy session with Iván Hernandez about his tobacco use risks and options using the USPSTF's Five A's approach:    Ask: Iván is using tobacco products.  Assess: We asked about/assessed behavioral health risk and factors affecting choice of behavior change goals/methods.  Specifically I asked about readiness to quit tobacco.  Advise: We gave clear, specific, and personalized behavior change advice, including information about personal health harms and benefits. Specifically, he was told that Quitting tobacco is one of the best things he can do for his health. I strongly encouraged him to quit.      Agree: We collaboratively selected appropriate treatment goals and methods based on the patient’s interest in and willingness to change the behavior.   Assist: We used behavior change techniques (self-help and/or counseling), to aid Iván in achieving  agreed-upon goals by acquiring the skills, confidence, and social/environmental supports for behavior change, supplemented with adjunctive medical treatments when appropriate. Additionally, national quitting tobacco aides were discussed.   Arrange: Follow up at next visit- see specific follow up below.    Time Counseled: 5 minutes       CAGE Alcohol Screen:   He has been screened for alcohol abuse and his score is not 0:  Cut: Have you ever felt you should Cut down on your drinking?: Yes  Annoyed: Have people Annoyed you by criticizing your drinking?: No  Guilty: Have you ever felt bad or Guilty about your drinking?: No  Eye Opener: Have you ever had a drink first thing in the morning to steady your nerves or to get rid of a hangover (Eye opener)?: No  Total Score: 1      Patient Care Team:  Glenn Gomez DO as PCP - General (Family Practice)    Review of Systems  GENERAL: feels well otherwise  SKIN: denies any unusual skin lesions  EYES: denies blurred vision or double vision  HEENT: denies nasal congestion, sinus pain or ST  LUNGS: denies shortness of breath with exertion  CARDIOVASCULAR: denies chest pain on exertion  GI: denies abdominal pain, denies heartburn  : 1 per night nocturia, no complaint of urinary incontinence  MUSCULOSKELETAL: denies back pain  NEURO: denies headaches  PSYCHE: denies depression or anxiety  HEMATOLOGIC: denies hx of anemia  ENDOCRINE: denies thyroid history  ALL/ASTHMA: denies hx of allergy or asthma    Objective:   Physical Exam  General Appearance:  Alert, cooperative, no distress, appears stated age   Head:  Normocephalic, without obvious abnormality, atraumatic   Eyes:  PERRL, conjunctiva/corneas clear, EOM's intact, both eyes   Ears:  Normal TM's and external ear canals, both ears   Nose: Nares normal, septum midline, mucosa normal, no drainage or sinus tenderness   Throat: Lips, mucosa, and tongue normal; teeth and gums normal   Neck: Supple, symmetrical, trachea midline, no  adenopathy, thyroid: not enlarged, symmetric, no tenderness/mass/nodules, no carotid bruit or JVD   Back:   Symmetric, no curvature, ROM normal, no CVA tenderness   Lungs:   Distant BS , clear   Chest Wall:  No tenderness or deformity   Heart:  Regular rate and rhythm, S1, S2 normal, no murmur, rub or gallop   Abdomen:   Soft, non-tender, bowel sounds active all four quadrants,  no masses, no organomegaly           Extremities: Extremities normal, atraumatic, no cyanosis or edema   Pulses: 2+ and symmetric   Skin: Skin color, texture, turgor normal, no rashes or lesions   Lymph nodes: Cervical, supraclavicular, and axillary nodes normal   Neurologic: Normal     /78   Pulse 54   Temp 97.6 °F (36.4 °C) (Temporal)   Resp 16   Ht 5' 11\" (1.803 m)   Wt 169 lb 8 oz (76.9 kg)   SpO2 100%   BMI 23.64 kg/m²  Estimated body mass index is 23.64 kg/m² as calculated from the following:    Height as of this encounter: 5' 11\" (1.803 m).    Weight as of this encounter: 169 lb 8 oz (76.9 kg).    Medicare Hearing Assessment:   Hearing Screening    Time taken: 3/19/2024 10:14 AM  Screening Method: Finger Rub  Finger Rub Result: Pass         Visual Acuity:   Right Eye Visual Acuity: Corrected Right Eye Chart Acuity: 20/25   Left Eye Visual Acuity: Corrected Left Eye Chart Acuity: 20/25   Both Eyes Visual Acuity: Corrected Both Eyes Chart Acuity: 20/25   Able To Tolerate Visual Acuity: Yes        Assessment & Plan:   Iván Hernandez is a 74 year old male who presents for a Medicare Assessment.     1. Encounter for annual health examination (Primary)  2. Smokers' cough (HCC)  3. COPD, mild (HCC)  4. Essential hypertension  5. Tobacco abuse  6. Type 2 diabetes mellitus with stage 2 chronic kidney disease, without long-term current use of insulin (HCC)  1. Encounter for annual health examination  Stable    2. Smokers' cough (HCC)  Encourage them counseled on discontinuing smoking.  5 minutes.    3. COPD, mild (HCC)  Fresh air.   Regular exercise.  Stable.    4. Essential hypertension  Monitor blood pressure.  Stable.    5. Tobacco abuse  Discontinue smoking encourage.  5 minutes counseling.    6. Type 2 diabetes mellitus with stage 2 chronic kidney disease, without long-term current use of insulin (HCC)  Lab work stable.  Discussed diet, exercise.  Avoiding seed oils.  Decrease processed food and sugar.        The patient indicates understanding of these issues and agrees to the plan.  Continue with current treatment plan.  Lab work ordered.  Patient reassured.  Reinforced healthy diet, lifestyle, and exercise.      No follow-ups on file.     Glenn Gomez DO, 3/19/2024     Supplementary Documentation:   General Health:  In the past six months, have you lost more than 10 pounds without trying?: 1 - Yes  Has your appetite been poor?: No  Type of Diet: Balanced;Low Carb  How does the patient maintain a good energy level?: Daily Walks  How would you describe your daily physical activity?: Moderate  How would you describe your current health state?: Good  How do you maintain positive mental well-being?: Social Interaction;Visiting Friends;Visiting Family  On a scale of 0 to 10, with 0 being no pain and 10 being severe pain, what is your pain level?: 1 - (Mild)  In the past six months, have you experienced urine leakage?: 0-No  At any time do you feel concerned for the safety/well-being of yourself and/or your children, in your home or elsewhere?: No  Have you had any immunizations at another office such as Influenza, Hepatitis B, Tetanus, or Pneumococcal?: No        Iván Hernandez's SCREENING SCHEDULE   Tests on this list are recommended by your physician but may not be covered, or covered at this frequency, by your insurer.   Please check with your insurance carrier before scheduling to verify coverage.   PREVENTATIVE SERVICES FREQUENCY &  COVERAGE DETAILS LAST COMPLETION DATE   Diabetes Screening    Fasting Blood Sugar / Glucose    One  screening every 12 months if never tested or if previously tested but not diagnosed with pre-diabetes   One screening every 6 months if diagnosed with pre-diabetes Lab Results   Component Value Date     (H) 03/06/2024        Cardiovascular Disease Screening    Lipid Panel  Cholesterol  Lipoprotein (HDL)  Triglycerides Covered every 5 years for all Medicare beneficiaries without apparent signs or symptoms of cardiovascular disease Lab Results   Component Value Date    CHOLEST 168 03/06/2024    HDL 38 (L) 03/06/2024     (H) 03/06/2024    TRIG 131 03/06/2024         Electrocardiogram (EKG)   Covered if needed at Welcome to Medicare, and non-screening if indicated for medical reasons -      Ultrasound Screening for Abdominal Aortic Aneurysm (AAA) Covered once in a lifetime for one of the following risk factors    Men who are 65-75 years old and have ever smoked    Anyone with a family history -     Colorectal Cancer Screening  Covered for ages 50-85; only need ONE of the following:    Colonoscopy   Covered every 10 years    Covered every 2 years if patient is at high risk or previous colonoscopy was abnormal -    No recommendations at this time    Flexible Sigmoidoscopy   Covered every 4 years -    Fecal Occult Blood Test Covered annually -   Prostate Cancer Screening    Prostate-Specific Antigen (PSA) Annually Lab Results   Component Value Date    PSA 1.28 01/21/2013    PSA 1.28 01/21/2013     There are no preventive care reminders to display for this patient.   Immunizations    Influenza Covered once per flu season  Please get every year -  Influenza Vaccine(1) Never done    Pneumococcal Each vaccine ([id number] & [id number]) covered once after 65 Prevnar 13: 12/14/2015    Fewdeqcnh47: 10/20/2017     No recommendations at this time    Hepatitis B One screening covered for patients with certain risk factors   -  No recommendations at this time    Tetanus Toxoid Not covered by Medicare Part B unless  medically necessary (cut with metal); may be covered with your pharmacy prescription benefits 05/15/2023    Tetanus, Diptheria and Pertusis TD and TDaP Not covered by Medicare Part B -  No recommendations at this time    Zoster Not covered by Medicare Part B; may be covered with your pharmacy  prescription benefits -  Zoster Vaccines(1 of 2) Never done     Diabetes      Hemoglobin A1C Annually; if last result is elevated, may be asked to retest more frequently.    Medicare covers every 3 months Lab Results   Component Value Date     (H) 03/06/2024    A1C 6.3 (H) 03/06/2024       No recommendations at this time    Creat/alb ratio Annually Lab Results   Component Value Date    MICROALBCREA 16.9 06/25/2020       LDL Annually Lab Results   Component Value Date     (H) 03/06/2024       Dilated Eye Exam Annually Last Diabetic Eye Exam:  Last Dilated Eye Exam: 10/11/23  Eye Exam shows Diabetic Retinopathy?: No       Chronic Obstructive Pulmonary Disease (COPD)    Spirometry Annually Spirometry date:

## 2024-03-19 NOTE — PATIENT INSTRUCTIONS
Iván Hernandez's SCREENING SCHEDULE   Tests on this list are recommended by your physician but may not be covered, or covered at this frequency, by your insurer.   Please check with your insurance carrier before scheduling to verify coverage.   PREVENTATIVE SERVICES FREQUENCY &  COVERAGE DETAILS LAST COMPLETION DATE   Diabetes Screening    Fasting Blood Sugar / Glucose    One screening every 12 months if never tested or if previously tested but not diagnosed with pre-diabetes   One screening every 6 months if diagnosed with pre-diabetes Lab Results   Component Value Date     (H) 03/06/2024        Cardiovascular Disease Screening    Lipid Panel  Cholesterol  Lipoprotein (HDL)  Triglycerides Covered every 5 years for all Medicare beneficiaries without apparent signs or symptoms of cardiovascular disease Lab Results   Component Value Date    CHOLEST 168 03/06/2024    HDL 38 (L) 03/06/2024     (H) 03/06/2024    TRIG 131 03/06/2024         Electrocardiogram (EKG)   Covered if needed at Welcome to Medicare, and non-screening if indicated for medical reasons -      Ultrasound Screening for Abdominal Aortic Aneurysm (AAA) Covered once in a lifetime for one of the following risk factors   • Men who are 65-75 years old and have ever smoked   • Anyone with a family history -     Colorectal Cancer Screening  Covered for ages 50-85; only need ONE of the following:    Colonoscopy   Covered every 10 years    Covered every 2 years if patient is at high risk or previous colonoscopy was abnormal -    No recommendations at this time    Flexible Sigmoidoscopy   Covered every 4 years -    Fecal Occult Blood Test Covered annually -   Prostate Cancer Screening    Prostate-Specific Antigen (PSA) Annually Lab Results   Component Value Date    PSA 1.28 01/21/2013    PSA 1.28 01/21/2013     There are no preventive care reminders to display for this patient.   Immunizations    Influenza Covered once per flu season  Please get  every year -  Influenza Vaccine(1) Never done    Pneumococcal Each vaccine (Levgpcq72 & [id number]) covered once after 65 Prevnar 13: 12/14/2015    Fmlhylkgl25: 10/20/2017     No recommendations at this time    Hepatitis B One screening covered for patients with certain risk factors   -  No recommendations at this time    Tetanus Toxoid Not covered by Medicare Part B unless medically necessary (cut with metal); may be covered with your pharmacy prescription benefits 05/15/2023    Tetanus, Diptheria and Pertusis TD and TDaP Not covered by Medicare Part B -  No recommendations at this time    Zoster Not covered by Medicare Part B; may be covered with your pharmacy  prescription benefits -  Zoster Vaccines(1 of 2) Never done     Diabetes      Hemoglobin A1C Annually; if last result is elevated, may be asked to retest more frequently.    Medicare covers every 3 months Lab Results   Component Value Date     (H) 03/06/2024    A1C 6.3 (H) 03/06/2024       No recommendations at this time    Creat/alb ratio Annually Lab Results   Component Value Date    MICROALBCREA 16.9 06/25/2020       LDL Annually Lab Results   Component Value Date     (H) 03/06/2024       Dilated Eye Exam Annually [unfilled]     Chronic Obstructive Pulmonary Disease (COPD)    Spirometry Annually Spirometry date:

## 2024-04-11 DIAGNOSIS — I10 ESSENTIAL HYPERTENSION: ICD-10-CM

## 2024-04-11 RX ORDER — METOPROLOL TARTRATE 100 MG/1
TABLET ORAL
Qty: 90 TABLET | Refills: 0 | Status: SHIPPED | OUTPATIENT
Start: 2024-04-11 | End: 2024-04-15

## 2024-04-11 NOTE — TELEPHONE ENCOUNTER
Last refill: 01/15/24  Qty: 90  W/ 0 refills  Last ov: 03/19/24    Requested Prescriptions     Pending Prescriptions Disp Refills    METOPROLOL TARTRATE 100 MG Oral Tab [Pharmacy Med Name: METOPROLOL TARTRATE 100MG TABLETS] 90 tablet 0     Sig: TAKE 1 TABLET(100 MG) BY MOUTH IN THE MORNING     No future appointments.

## 2024-04-14 DIAGNOSIS — I10 ESSENTIAL HYPERTENSION: ICD-10-CM

## 2024-04-15 RX ORDER — METOPROLOL TARTRATE 100 MG/1
TABLET ORAL
Qty: 90 TABLET | Refills: 0 | Status: SHIPPED | OUTPATIENT
Start: 2024-04-15

## 2024-04-15 NOTE — TELEPHONE ENCOUNTER
Last refill: 04/11/24  qtY: 90  W/ 0 refills  Last ov: 03/19/24    Requested Prescriptions     Pending Prescriptions Disp Refills    METOPROLOL TARTRATE 100 MG Oral Tab [Pharmacy Med Name: METOPROLOL TARTRATE 100MG TABLETS] 90 tablet 0     Sig: TAKE 1 TABLET(100 MG) BY MOUTH IN THE MORNING     No future appointments.

## 2024-04-22 DIAGNOSIS — E11.22 TYPE 2 DIABETES MELLITUS WITH STAGE 2 CHRONIC KIDNEY DISEASE, WITHOUT LONG-TERM CURRENT USE OF INSULIN (HCC): Primary | ICD-10-CM

## 2024-04-22 DIAGNOSIS — N18.2 TYPE 2 DIABETES MELLITUS WITH STAGE 2 CHRONIC KIDNEY DISEASE, WITHOUT LONG-TERM CURRENT USE OF INSULIN (HCC): Primary | ICD-10-CM

## 2024-05-09 DIAGNOSIS — R73.9 HYPERGLYCEMIA: ICD-10-CM

## 2024-05-09 RX ORDER — METFORMIN HYDROCHLORIDE 500 MG/1
500 TABLET, EXTENDED RELEASE ORAL
Qty: 90 TABLET | Refills: 0 | Status: SHIPPED | OUTPATIENT
Start: 2024-05-09

## 2024-05-09 NOTE — TELEPHONE ENCOUNTER
Last refill: 02/14/24  Qty: 90  W/ 0 refills  Last ov: 03/19/24    Requested Prescriptions     Pending Prescriptions Disp Refills    metFORMIN  MG Oral Tablet 24 Hr 90 tablet 0     Sig: Take 1 tablet (500 mg total) by mouth daily with breakfast.     No future appointments.

## 2024-08-05 DIAGNOSIS — R73.9 HYPERGLYCEMIA: ICD-10-CM

## 2024-08-05 RX ORDER — METFORMIN HYDROCHLORIDE 500 MG/1
500 TABLET, EXTENDED RELEASE ORAL
Qty: 90 TABLET | Refills: 0 | Status: SHIPPED | OUTPATIENT
Start: 2024-08-05

## 2024-09-06 ENCOUNTER — LABORATORY ENCOUNTER (OUTPATIENT)
Dept: LAB | Age: 74
End: 2024-09-06
Attending: FAMILY MEDICINE
Payer: MEDICARE

## 2024-09-06 DIAGNOSIS — I10 PRIMARY HYPERTENSION: ICD-10-CM

## 2024-09-06 DIAGNOSIS — E11.65 TYPE 2 DIABETES MELLITUS WITH HYPERGLYCEMIA, WITHOUT LONG-TERM CURRENT USE OF INSULIN (HCC): ICD-10-CM

## 2024-09-06 DIAGNOSIS — E11.22 TYPE 2 DIABETES MELLITUS WITH STAGE 2 CHRONIC KIDNEY DISEASE, WITHOUT LONG-TERM CURRENT USE OF INSULIN (HCC): ICD-10-CM

## 2024-09-06 DIAGNOSIS — N18.2 TYPE 2 DIABETES MELLITUS WITH STAGE 2 CHRONIC KIDNEY DISEASE, WITHOUT LONG-TERM CURRENT USE OF INSULIN (HCC): ICD-10-CM

## 2024-09-06 LAB
ALBUMIN SERPL-MCNC: 4.3 G/DL (ref 3.2–4.8)
ALBUMIN/GLOB SERPL: 1.5 {RATIO} (ref 1–2)
ALP LIVER SERPL-CCNC: 77 U/L
ALT SERPL-CCNC: 7 U/L
ANION GAP SERPL CALC-SCNC: 5 MMOL/L (ref 0–18)
AST SERPL-CCNC: 12 U/L (ref ?–34)
BILIRUB SERPL-MCNC: 0.3 MG/DL (ref 0.2–1.1)
BUN BLD-MCNC: 19 MG/DL (ref 9–23)
CALCIUM BLD-MCNC: 9.9 MG/DL (ref 8.7–10.4)
CHLORIDE SERPL-SCNC: 104 MMOL/L (ref 98–112)
CHOLEST SERPL-MCNC: 148 MG/DL (ref ?–200)
CO2 SERPL-SCNC: 28 MMOL/L (ref 21–32)
CREAT BLD-MCNC: 0.98 MG/DL
CREAT UR-SCNC: 65.1 MG/DL
EGFRCR SERPLBLD CKD-EPI 2021: 80 ML/MIN/1.73M2 (ref 60–?)
EST. AVERAGE GLUCOSE BLD GHB EST-MCNC: 123 MG/DL (ref 68–126)
FASTING PATIENT LIPID ANSWER: YES
FASTING STATUS PATIENT QL REPORTED: YES
GLOBULIN PLAS-MCNC: 2.8 G/DL (ref 2–3.5)
GLUCOSE BLD-MCNC: 122 MG/DL (ref 70–99)
HBA1C MFR BLD: 5.9 % (ref ?–5.7)
HDLC SERPL-MCNC: 39 MG/DL (ref 40–59)
LDLC SERPL CALC-MCNC: 91 MG/DL (ref ?–100)
MICROALBUMIN UR-MCNC: <0.3 MG/DL
NONHDLC SERPL-MCNC: 109 MG/DL (ref ?–130)
OSMOLALITY SERPL CALC.SUM OF ELEC: 288 MOSM/KG (ref 275–295)
POTASSIUM SERPL-SCNC: 4.8 MMOL/L (ref 3.5–5.1)
PROT SERPL-MCNC: 7.1 G/DL (ref 5.7–8.2)
SODIUM SERPL-SCNC: 137 MMOL/L (ref 136–145)
TRIGL SERPL-MCNC: 97 MG/DL (ref 30–149)
VLDLC SERPL CALC-MCNC: 16 MG/DL (ref 0–30)

## 2024-09-06 PROCEDURE — 82043 UR ALBUMIN QUANTITATIVE: CPT

## 2024-09-06 PROCEDURE — 36415 COLL VENOUS BLD VENIPUNCTURE: CPT

## 2024-09-06 PROCEDURE — 83036 HEMOGLOBIN GLYCOSYLATED A1C: CPT

## 2024-09-06 PROCEDURE — 80061 LIPID PANEL: CPT

## 2024-09-06 PROCEDURE — 82570 ASSAY OF URINE CREATININE: CPT

## 2024-09-06 PROCEDURE — 80053 COMPREHEN METABOLIC PANEL: CPT

## 2024-09-07 DIAGNOSIS — I10 ESSENTIAL HYPERTENSION: ICD-10-CM

## 2024-09-07 DIAGNOSIS — E11.65 TYPE 2 DIABETES MELLITUS WITH HYPERGLYCEMIA, WITHOUT LONG-TERM CURRENT USE OF INSULIN (HCC): Primary | ICD-10-CM

## 2024-11-04 ENCOUNTER — TELEPHONE (OUTPATIENT)
Dept: FAMILY MEDICINE CLINIC | Facility: CLINIC | Age: 74
End: 2024-11-04

## 2024-11-04 ENCOUNTER — OFFICE VISIT (OUTPATIENT)
Dept: FAMILY MEDICINE CLINIC | Facility: CLINIC | Age: 74
End: 2024-11-04
Payer: MEDICARE

## 2024-11-04 VITALS
SYSTOLIC BLOOD PRESSURE: 130 MMHG | OXYGEN SATURATION: 100 % | TEMPERATURE: 98 F | DIASTOLIC BLOOD PRESSURE: 70 MMHG | HEART RATE: 62 BPM | HEIGHT: 71 IN | WEIGHT: 164 LBS | BODY MASS INDEX: 22.96 KG/M2

## 2024-11-04 DIAGNOSIS — E11.22 TYPE 2 DIABETES MELLITUS WITH STAGE 3A CHRONIC KIDNEY DISEASE, WITHOUT LONG-TERM CURRENT USE OF INSULIN (HCC): Primary | Chronic | ICD-10-CM

## 2024-11-04 DIAGNOSIS — R73.9 HYPERGLYCEMIA: ICD-10-CM

## 2024-11-04 DIAGNOSIS — I10 ESSENTIAL HYPERTENSION: ICD-10-CM

## 2024-11-04 DIAGNOSIS — N18.31 TYPE 2 DIABETES MELLITUS WITH STAGE 3A CHRONIC KIDNEY DISEASE, WITHOUT LONG-TERM CURRENT USE OF INSULIN (HCC): Primary | Chronic | ICD-10-CM

## 2024-11-04 DIAGNOSIS — Z72.0 TOBACCO ABUSE: ICD-10-CM

## 2024-11-04 DIAGNOSIS — J41.0 SMOKERS' COUGH (HCC): Chronic | ICD-10-CM

## 2024-11-04 PROCEDURE — 3075F SYST BP GE 130 - 139MM HG: CPT | Performed by: FAMILY MEDICINE

## 2024-11-04 PROCEDURE — 1159F MED LIST DOCD IN RCRD: CPT | Performed by: FAMILY MEDICINE

## 2024-11-04 PROCEDURE — 3008F BODY MASS INDEX DOCD: CPT | Performed by: FAMILY MEDICINE

## 2024-11-04 PROCEDURE — 99214 OFFICE O/P EST MOD 30 MIN: CPT | Performed by: FAMILY MEDICINE

## 2024-11-04 PROCEDURE — 3078F DIAST BP <80 MM HG: CPT | Performed by: FAMILY MEDICINE

## 2024-11-04 RX ORDER — METFORMIN HYDROCHLORIDE 500 MG/1
500 TABLET, EXTENDED RELEASE ORAL
Qty: 90 TABLET | Refills: 0 | Status: SHIPPED | OUTPATIENT
Start: 2024-11-04

## 2024-11-04 RX ORDER — METOPROLOL TARTRATE 100 MG/1
100 TABLET ORAL EVERY MORNING
Qty: 90 TABLET | Refills: 0 | Status: SHIPPED | OUTPATIENT
Start: 2024-11-04

## 2024-11-04 NOTE — TELEPHONE ENCOUNTER
Last seen 3/19/2024    Future Appointments   Date Time Provider Department Center   11/4/2024  2:30 PM Glenn Gomez, DO [facility]     Next labs due 3/7/2025

## 2024-11-04 NOTE — PROGRESS NOTES
Subjective:   Patient ID: Iván Hernandez is a 74 year old male.    Medication Follow-Up  Pertinent negatives include no chest pain.   Patient without problems with medications.  Blood pressure stable.  Denies chest pain, palpitations, shortness of breath.  Breathing comfortably.    History/Other:   Review of Systems   Respiratory:  Negative for shortness of breath and wheezing.    Cardiovascular:  Negative for chest pain, palpitations and leg swelling.   Gastrointestinal: Negative.    Genitourinary: Negative.      Current Outpatient Medications   Medication Sig Dispense Refill    metFORMIN  MG Oral Tablet 24 Hr Take 1 tablet (500 mg total) by mouth daily with breakfast. 90 tablet 0    metoprolol tartrate 100 MG Oral Tab Take 1 tablet (100 mg total) by mouth every morning. 90 tablet 0    Vitamin D3 50 MCG (2000 UT) Oral Cap Take 1 capsule (2,000 Units total) by mouth daily.      aspirin 81 MG Oral Tab EC Take 1 tablet (81 mg total) by mouth daily.       Allergies:Allergies[1]    Objective:   Physical Exam  Vitals reviewed.   Constitutional:       Appearance: Normal appearance.   Neck:      Vascular: No carotid bruit.   Cardiovascular:      Rate and Rhythm: Normal rate and regular rhythm.      Pulses: Normal pulses.      Heart sounds: Normal heart sounds.   Pulmonary:      Effort: Pulmonary effort is normal.      Breath sounds: Normal breath sounds.   Musculoskeletal:      Cervical back: Normal range of motion and neck supple.      Right lower leg: No edema.      Left lower leg: No edema.   Lymphadenopathy:      Cervical: No cervical adenopathy.   Skin:     General: Skin is warm and dry.   Neurological:      Mental Status: He is alert.       /70   Pulse 62   Temp 97.6 °F (36.4 °C) (Temporal)   Ht 5' 11\" (1.803 m)   Wt 164 lb (74.4 kg)   SpO2 100%   BMI 22.87 kg/m²   Laboratory Encounter on 09/06/2024   Component Date Value Ref Range Status    Glucose 09/06/2024 122 (H)  70 - 99 mg/dL Final    Sodium  09/06/2024 137  136 - 145 mmol/L Final    Potassium 09/06/2024 4.8  3.5 - 5.1 mmol/L Final    Chloride 09/06/2024 104  98 - 112 mmol/L Final    CO2 09/06/2024 28.0  21.0 - 32.0 mmol/L Final    Anion Gap 09/06/2024 5  0 - 18 mmol/L Final    BUN 09/06/2024 19  9 - 23 mg/dL Final    Creatinine 09/06/2024 0.98  0.70 - 1.30 mg/dL Final    Calcium, Total 09/06/2024 9.9  8.7 - 10.4 mg/dL Final    Calculated Osmolality 09/06/2024 288  275 - 295 mOsm/kg Final    eGFR-Cr 09/06/2024 80  >=60 mL/min/1.73m2 Final    AST 09/06/2024 12  <34 U/L Final    ALT 09/06/2024 7 (L)  10 - 49 U/L Final    Alkaline Phosphatase 09/06/2024 77  45 - 117 U/L Final    Bilirubin, Total 09/06/2024 0.3  0.2 - 1.1 mg/dL Final    Total Protein 09/06/2024 7.1  5.7 - 8.2 g/dL Final    Albumin 09/06/2024 4.3  3.2 - 4.8 g/dL Final    Globulin  09/06/2024 2.8  2.0 - 3.5 g/dL Final    A/G Ratio 09/06/2024 1.5  1.0 - 2.0 Final    Patient Fasting for CMP? 09/06/2024 Yes   Final    Cholesterol, Total 09/06/2024 148  <200 mg/dL Final    Desirable  <200 mg/dL  Borderline  200-239 mg/dL  High      >=240 mg/dL        HDL Cholesterol 09/06/2024 39 (L)  40 - 59 mg/dL Final    Interpretive Information:   An HDL cholesterol <40 mg/dL is low and constitutes a coronary heart disease risk factor. An HDL cholesterol >60 mg/dL is a negative risk factor for coronary heart disease.        Triglycerides 09/06/2024 97  30 - 149 mg/dL Final    Reference interval for fasting triglycerides  Desirable: <150 mg/dL  Borderline: 150-199 mg/dL  High: 200-499 mg/dL  Very High: >=500 mg/dL          LDL Cholesterol 09/06/2024 91  <100 mg/dL Final    Optimal            <100 mg/dL   Near/Above OptimaL 100-129 mg/dL   Borderline High    130-159 mg/dL   High               160-189 mg/dL    Very High          >190 mg/dL         VLDL 09/06/2024 16  0 - 30 mg/dL Final    Non HDL Chol 09/06/2024 109  <130 mg/dL Final    Desirable  <130 mg/dL   Borderline  130-159 mg/dL   High        160-189 mg/dL        Very high >=190 mg/dL        Patient Fasting for Lipid? 09/06/2024 Yes   Final    HgbA1C 09/06/2024 5.9 (H)  <5.7 % Final     Normal HbA1C:     <5.7%      Pre-Diabetic:     5.7 - 6.4%      Diabetic:         >6.4%      Diabetic Control: <7.0%        Estimated Average Glucose 09/06/2024 123  68 - 126 mg/dL Final    eAG is the estimated average glucose calculated from Hgb A1c according to the formula recommended by the American Diabetes Association. eAG levels reflect the long term average glucose and may not correlate with random or fasting glucose levels since these represent specific points in time.           Microalbumin, Urine 09/06/2024 <0.30  mg/dL Final    Creatinine Ur Random 09/06/2024 65.10  mg/dL Final    Malb/Cre Calc 09/06/2024    Final    Unable to calculate due to Urine Microalbumin <0.3 mg/dL         Reviewed w/ pt  Assessment & Plan:   1. Type 2 diabetes mellitus with stage 3a chronic kidney disease, without long-term current use of insulin ([facility])    2. Essential hypertension    3. Smokers' cough ([facility])    4. Tobacco abuse        No orders of the defined types were placed in this encounter.      Meds This Visit:  Requested Prescriptions      No prescriptions requested or ordered in this encounter       Imaging & Referrals:  None       [1]   Allergies  Allergen Reactions    Eggs Or Egg-Derived Products NAUSEA AND VOMITING    Chocolate OTHER (SEE COMMENTS)    Pollen OTHER (SEE COMMENTS) and Runny nose

## 2024-12-10 NOTE — PROGRESS NOTES
HPI:    Patient ID: Belia Miller is a 68year old male. Pt doing well  Breathing OK  BP stable. Without problems with medications. Continues to work. HPI    Review of Systems   Respiratory: Negative for cough and shortness of breath.     Kayley Tolentino Yes

## 2025-01-14 ENCOUNTER — TELEPHONE (OUTPATIENT)
Dept: FAMILY MEDICINE CLINIC | Facility: CLINIC | Age: 75
End: 2025-01-14

## 2025-01-30 DIAGNOSIS — I10 ESSENTIAL HYPERTENSION: ICD-10-CM

## 2025-01-30 DIAGNOSIS — R73.9 HYPERGLYCEMIA: ICD-10-CM

## 2025-01-30 RX ORDER — METFORMIN HYDROCHLORIDE 500 MG/1
500 TABLET, EXTENDED RELEASE ORAL
Qty: 90 TABLET | Refills: 0 | Status: SHIPPED | OUTPATIENT
Start: 2025-01-30

## 2025-01-30 RX ORDER — METOPROLOL TARTRATE 100 MG/1
100 TABLET ORAL EVERY MORNING
Qty: 90 TABLET | Refills: 0 | Status: SHIPPED | OUTPATIENT
Start: 2025-01-30

## 2025-01-30 NOTE — TELEPHONE ENCOUNTER
Hypertension Medications Protocol [id number]/30/2025 11:50 AM   Protocol Details CMP or BMP in past 12 months    Last BP reading less than 140/90    In person appointment or virtual visit in the past 12 mos or appointment in next 3 mos    EGFRCR or GFRNAA > 50    Medication is active on med list        Diabetes Medication Protocol [id number]/30/2025 11:50 AM   Protocol Details Last A1C < 7.5 and within past 6 months    In person appointment or virtual visit in the past 6 mos or appointment in next 3 mos    Microalbumin procedure in past 12 months or taking ACE/ARB    EGFRCR or GFRNAA > 50    GFR in the past 12 months    Medication is active on med list

## 2025-03-03 ENCOUNTER — LABORATORY ENCOUNTER (OUTPATIENT)
Dept: LAB | Age: 75
End: 2025-03-03
Attending: FAMILY MEDICINE
Payer: MEDICARE

## 2025-03-03 DIAGNOSIS — I10 ESSENTIAL HYPERTENSION: ICD-10-CM

## 2025-03-03 DIAGNOSIS — E11.65 TYPE 2 DIABETES MELLITUS WITH HYPERGLYCEMIA, WITHOUT LONG-TERM CURRENT USE OF INSULIN (HCC): ICD-10-CM

## 2025-03-03 LAB
ALBUMIN SERPL-MCNC: 4.6 G/DL (ref 3.2–4.8)
ALBUMIN/GLOB SERPL: 1.7 {RATIO} (ref 1–2)
ALP LIVER SERPL-CCNC: 75 U/L
ALT SERPL-CCNC: 10 U/L
ANION GAP SERPL CALC-SCNC: 6 MMOL/L (ref 0–18)
AST SERPL-CCNC: 15 U/L (ref ?–34)
BILIRUB SERPL-MCNC: 0.4 MG/DL (ref 0.2–1.1)
BUN BLD-MCNC: 9 MG/DL (ref 9–23)
CALCIUM BLD-MCNC: 9.6 MG/DL (ref 8.7–10.6)
CHLORIDE SERPL-SCNC: 101 MMOL/L (ref 98–112)
CHOLEST SERPL-MCNC: 146 MG/DL (ref ?–200)
CO2 SERPL-SCNC: 29 MMOL/L (ref 21–32)
CREAT BLD-MCNC: 0.81 MG/DL
CREAT UR-SCNC: 79.3 MG/DL
EGFRCR SERPLBLD CKD-EPI 2021: 91 ML/MIN/1.73M2 (ref 60–?)
EST. AVERAGE GLUCOSE BLD GHB EST-MCNC: 126 MG/DL (ref 68–126)
FASTING PATIENT LIPID ANSWER: YES
FASTING STATUS PATIENT QL REPORTED: YES
GLOBULIN PLAS-MCNC: 2.7 G/DL (ref 2–3.5)
GLUCOSE BLD-MCNC: 117 MG/DL (ref 70–99)
HBA1C MFR BLD: 6 % (ref ?–5.7)
HDLC SERPL-MCNC: 34 MG/DL (ref 40–59)
LDLC SERPL CALC-MCNC: 93 MG/DL (ref ?–100)
MICROALBUMIN UR-MCNC: 0.4 MG/DL
MICROALBUMIN/CREAT 24H UR-RTO: 5 UG/MG (ref ?–30)
NONHDLC SERPL-MCNC: 112 MG/DL (ref ?–130)
OSMOLALITY SERPL CALC.SUM OF ELEC: 282 MOSM/KG (ref 275–295)
POTASSIUM SERPL-SCNC: 4.9 MMOL/L (ref 3.5–5.1)
PROT SERPL-MCNC: 7.3 G/DL (ref 5.7–8.2)
SODIUM SERPL-SCNC: 136 MMOL/L (ref 136–145)
TRIGL SERPL-MCNC: 101 MG/DL (ref 30–149)
VLDLC SERPL CALC-MCNC: 16 MG/DL (ref 0–30)

## 2025-03-03 PROCEDURE — 36415 COLL VENOUS BLD VENIPUNCTURE: CPT

## 2025-03-03 PROCEDURE — 80053 COMPREHEN METABOLIC PANEL: CPT

## 2025-03-03 PROCEDURE — 83036 HEMOGLOBIN GLYCOSYLATED A1C: CPT

## 2025-03-03 PROCEDURE — 80061 LIPID PANEL: CPT

## 2025-03-03 PROCEDURE — 82043 UR ALBUMIN QUANTITATIVE: CPT

## 2025-03-03 PROCEDURE — 82570 ASSAY OF URINE CREATININE: CPT

## 2025-03-10 ENCOUNTER — TELEPHONE (OUTPATIENT)
Dept: FAMILY MEDICINE CLINIC | Facility: CLINIC | Age: 75
End: 2025-03-10

## 2025-03-10 ENCOUNTER — OFFICE VISIT (OUTPATIENT)
Dept: FAMILY MEDICINE CLINIC | Facility: CLINIC | Age: 75
End: 2025-03-10
Payer: MEDICARE

## 2025-03-10 VITALS
WEIGHT: 167 LBS | OXYGEN SATURATION: 99 % | SYSTOLIC BLOOD PRESSURE: 130 MMHG | HEIGHT: 71 IN | BODY MASS INDEX: 23.38 KG/M2 | HEART RATE: 58 BPM | DIASTOLIC BLOOD PRESSURE: 80 MMHG | TEMPERATURE: 98 F

## 2025-03-10 DIAGNOSIS — Z00.00 ENCOUNTER FOR ANNUAL HEALTH EXAMINATION: Primary | ICD-10-CM

## 2025-03-10 DIAGNOSIS — I10 ESSENTIAL HYPERTENSION: ICD-10-CM

## 2025-03-10 DIAGNOSIS — J41.0 SMOKERS' COUGH (HCC): ICD-10-CM

## 2025-03-10 DIAGNOSIS — N18.2 TYPE 2 DIABETES MELLITUS WITH STAGE 2 CHRONIC KIDNEY DISEASE, WITHOUT LONG-TERM CURRENT USE OF INSULIN (HCC): ICD-10-CM

## 2025-03-10 DIAGNOSIS — R73.9 HYPERGLYCEMIA: ICD-10-CM

## 2025-03-10 DIAGNOSIS — E11.22 TYPE 2 DIABETES MELLITUS WITH STAGE 2 CHRONIC KIDNEY DISEASE, WITHOUT LONG-TERM CURRENT USE OF INSULIN (HCC): ICD-10-CM

## 2025-03-10 DIAGNOSIS — J44.9 COPD, MILD (HCC): Chronic | ICD-10-CM

## 2025-03-10 DIAGNOSIS — Z72.0 TOBACCO ABUSE: ICD-10-CM

## 2025-03-10 PROBLEM — E11.65 TYPE 2 DIABETES MELLITUS WITH HYPERGLYCEMIA, WITHOUT LONG-TERM CURRENT USE OF INSULIN (HCC): Chronic | Status: RESOLVED | Noted: 2022-03-02 | Resolved: 2025-03-10

## 2025-03-10 PROBLEM — E11.9 DIABETES MELLITUS, TYPE 2 (HCC): Chronic | Status: RESOLVED | Noted: 2022-03-02 | Resolved: 2025-03-10

## 2025-03-10 RX ORDER — LANCETS 28 GAUGE
1 EACH MISCELLANEOUS AS NEEDED
Qty: 100 EACH | Refills: 1 | Status: SHIPPED | OUTPATIENT
Start: 2025-03-10 | End: 2026-03-10

## 2025-03-10 RX ORDER — BLOOD-GLUCOSE METER
1 KIT MISCELLANEOUS 2 TIMES DAILY
Qty: 1 KIT | Refills: 0 | Status: SHIPPED | OUTPATIENT
Start: 2025-03-10 | End: 2026-03-10

## 2025-03-10 RX ORDER — BLOOD-GLUCOSE METER
KIT MISCELLANEOUS
Qty: 100 STRIP | Refills: 0 | Status: SHIPPED | OUTPATIENT
Start: 2025-03-10 | End: 2025-03-10

## 2025-03-10 RX ORDER — BLOOD-GLUCOSE METER
KIT MISCELLANEOUS
Qty: 100 STRIP | Refills: 1 | Status: SHIPPED | OUTPATIENT
Start: 2025-03-10

## 2025-03-10 RX ORDER — LANCETS 28 GAUGE
1 EACH MISCELLANEOUS AS NEEDED
Qty: 100 EACH | Refills: 0 | Status: SHIPPED | OUTPATIENT
Start: 2025-03-10 | End: 2025-03-10

## 2025-03-10 NOTE — PROGRESS NOTES
Subjective:   Iván Hernandez is a 75 year old male who presents for a MA AHA (Medicare Advantage Annual Health Assessment) and Subsequent Annual Wellness visit (Pt already had Initial Annual Wellness) and scheduled follow up of multiple significant but stable problems.       History/Other:   Fall Risk Assessment:   He has been screened for Falls and is low risk.      Cognitive Assessment:   He had a completely normal cognitive assessment - see flowsheet entries       Functional Ability/Status:   Iván Hernandez has a completely normal functional assessment. See flowsheet for details.      Depression Screening (PHQ):  PHQ-2 SCORE: 0  , done 3/7/2025   If you checked off any problems, how difficult have these problems made it for you to do your work, take care of things at home, or get along with other people?: Not difficult at all              Advanced Directives:   He does NOT have a Living Will. [Do you have a living will?: (Patient-Rptd) No]  He does NOT have a Power of  for Health Care. [Do you have a healthcare power of ?: (Patient-Rptd) No]  Discussed Advance Care Planning with patient (and family/surrogate if present). Standard forms made available to patient in After Visit Summary.      Patient Active Problem List   Diagnosis    Essential hypertension    Hyperglycemia    Tobacco abuse    Right carotid bruit    H/O cervical fracture,C5    Smokers' cough (HCC)    COPD, mild (HCC)    Type 2 diabetes mellitus with diabetic chronic kidney disease (HCC)    Closed nondisplaced fracture of fifth cervical vertebra with routine healing    Neck injury, subsequent encounter     Allergies:  He is allergic to eggs or egg-derived products, chocolate, and pollen.    Current Medications:  Outpatient Medications Marked as Taking for the 3/10/25 encounter (Office Visit) with Glenn Gomez, DO   Medication Sig    Blood Glucose Monitoring Suppl (FREESTYLE FREEDOM LITE) w/Device Does not apply Kit 1 Device by Other  route 2 (two) times daily. Use as directed. Check fasting and 2 hrs after a meal and check as needed.    Glucose Blood (FREESTYLE LITE TEST) In Vitro Strip Use as directed.    FreeStyle Lancets Does not apply Misc 1 Lancet by Finger stick route as needed (BID and PRN). Use as directed. Check fasting and 2 hrs after a meal and check as needed.    metFORMIN  MG Oral Tablet 24 Hr Take 1 tablet (500 mg total) by mouth daily with breakfast.    metoprolol tartrate 100 MG Oral Tab Take 1 tablet (100 mg total) by mouth every morning.    Vitamin D3 50 MCG (2000 UT) Oral Cap Take 1 capsule (2,000 Units total) by mouth daily.    aspirin 81 MG Oral Tab EC Take 1 tablet (81 mg total) by mouth daily.       Medical History:  He  has no past medical history on file.  Surgical History:  He  has no past surgical history on file.   Family History:  His family history includes CVA in his mother; Diabetes in his sister; Emphysema in his father.  Social History:  He  reports that he has been smoking cigarettes. He has a 50 pack-year smoking history. He has never used smokeless tobacco. He reports current alcohol use. He reports that he does not use drugs.    Tobacco:  Social History     Tobacco Use   Smoking Status Every Day    Current packs/day: 1.00    Average packs/day: 1 pack/day for 50.0 years (50.0 ttl pk-yrs)    Types: Cigarettes   Smokeless Tobacco Never     E-Cigarettes/Vaping       Questions Responses    E-Cigarette Use Never User           Tobacco cessation counseling for 3-10 minutes (add E/M code #53771).      CAGE Alcohol Screen:   He has been screened for alcohol abuse and his score is not 0:  Cut: Have you ever felt you should Cut down on your drinking?: (Patient-Rptd) Yes  Annoyed: Have people Annoyed you by criticizing your drinking?: (Patient-Rptd) No  Guilty: Have you ever felt bad or Guilty about your drinking?: (Patient-Rptd) No  Eye Opener: Have you ever had a drink first thing in the morning to steady your  nerves or to get rid of a hangover (Eye opener)?: (Patient-Rptd) No  Total Score: (Patient-Rptd) 1      Patient Care Team:  Glenn Gomez DO as PCP - General (Family Practice)    Review of Systems  GENERAL: feels well otherwise  SKIN: denies any unusual skin lesions  EYES: denies blurred vision or double vision  HEENT: denies nasal congestion, sinus pain or ST  LUNGS: denies shortness of breath with exertion  CARDIOVASCULAR: denies chest pain on exertion  GI: denies abdominal pain, denies heartburn  : 0 per night nocturia, no complaint of urinary incontinence  MUSCULOSKELETAL: denies back pain  NEURO: denies headaches  PSYCHE: denies depression or anxiety  HEMATOLOGIC: denies hx of anemia  ENDOCRINE: denies thyroid history  ALL/ASTHMA: denies hx of allergy or asthma    Objective:   Physical Exam  General Appearance:  Alert, cooperative, no distress, appears stated age   Head:  Normocephalic, without obvious abnormality, atraumatic   Eyes:  PERRL, conjunctiva/corneas clear, EOM's intact, both eyes   Ears:  Normal TM's and external ear canals, both ears   Nose: Nares normal, septum midline, mucosa normal, no drainage or sinus tenderness   Throat: Lips, mucosa, and tongue normal; teeth and gums normal   Neck: Supple, symmetrical, trachea midline, no adenopathy, thyroid: not enlarged, symmetric, no tenderness/mass/nodules, no carotid bruit or JVD   Back:   Symmetric, no curvature, ROM normal, no CVA tenderness   Lungs:   Clear to auscultation bilaterally, respirations unlabored   Chest Wall:  No tenderness or deformity   Heart:  Regular rate and rhythm, S1, S2 normal, no murmur, rub or gallop   Abdomen:   Soft, non-tender, bowel sounds active all four quadrants,  no masses, no organomegaly           Extremities: Extremities normal, atraumatic, no cyanosis or edema   Pulses: 2+ and symmetric   Skin: Skin color, texture, turgor normal, no rashes or lesions   Lymph nodes: Cervical, supraclavicular, and axillary nodes  normal   Neurologic: Normal     /80   Pulse 58   Temp 97.9 °F (36.6 °C) (Temporal)   Ht 5' 11\" (1.803 m)   Wt 167 lb (75.8 kg)   SpO2 99%   BMI 23.29 kg/m²  Estimated body mass index is 23.29 kg/m² as calculated from the following:    Height as of this encounter: 5' 11\" (1.803 m).    Weight as of this encounter: 167 lb (75.8 kg).    Medicare Hearing Assessment:   Hearing Screening    Time taken: 3/10/2025 10:54 AM  Screening Method: Finger Rub  Finger Rub Result: Pass         Visual Acuity:   Right Eye Visual Acuity: Corrected Right Eye Chart Acuity: 20/20   Left Eye Visual Acuity: Corrected Left Eye Chart Acuity: 20/20   Both Eyes Visual Acuity: Corrected     Able To Tolerate Visual Acuity: Yes        Assessment & Plan:   Iván Hernandez is a 75 year old male who presents for a Medicare Assessment.     1. Encounter for annual health examination (Primary)  2. Smokers' cough ([facility])  3. Type 2 diabetes mellitus with stage 2 chronic kidney disease, without long-term current use of insulin ([facility])  -     FreeStyle Freedom Lite; 1 Device by Other route 2 (two) times daily. Use as directed. Check fasting and 2 hrs after a meal and check as needed.  Dispense: 1 kit; Refill: 0  -     FreeStyle Lite Test; Use as directed.  Dispense: 100 strip; Refill: 0  -     FreeStyle Lancets; 1 Lancet by Finger stick route as needed (BID and PRN). Use as directed. Check fasting and 2 hrs after a meal and check as needed.  Dispense: 100 each; Refill: 0  4. Tobacco abuse  5. COPD, mild (HCC)  6. Hyperglycemia  -     FreeStyle Freedom Lite; 1 Device by Other route 2 (two) times daily. Use as directed. Check fasting and 2 hrs after a meal and check as needed.  Dispense: 1 kit; Refill: 0  -     FreeStyle Lite Test; Use as directed.  Dispense: 100 strip; Refill: 0  -     FreeStyle Lancets; 1 Lancet by Finger stick route as needed (BID and PRN). Use as directed. Check fasting and 2 hrs after a meal and check as needed.  Dispense: 100  each; Refill: 0  7. Essential hypertension  1. Smokers' cough (HCC)  DC tobacco  Fresh air / exercise   stable    2. Type 2 diabetes mellitus with stage 2 chronic kidney disease, without long-term current use of insulin (HCC)  Stable.  Continue aggressive management of blood sugar.  Monitor blood sugar.  Increase water intake.  Call with questions or problems.  - Blood Glucose Monitoring Suppl (FREESTYLE FREEDOM LITE) w/Device Does not apply Kit; 1 Device by Other route 2 (two) times daily. Use as directed. Check fasting and 2 hrs after a meal and check as needed.  Dispense: 1 kit; Refill: 0  - Glucose Blood (FREESTYLE LITE TEST) In Vitro Strip; Use as directed.  Dispense: 100 strip; Refill: 0  - FreeStyle Lancets Does not apply Misc; 1 Lancet by Finger stick route as needed (BID and PRN). Use as directed. Check fasting and 2 hrs after a meal and check as needed.  Dispense: 100 each; Refill: 0    3. Tobacco abuse  5 minutes.  Discontinue smoking.  Options.    4. COPD, mild (HCC)  Stable.  Regular aerobic exercise.    5. Hyperglycemia  Discussed diet, exercise.  Stable.  Accu-Cheks fasting, 2 hours after eating.  Follow-up in 1 month to review.  - Blood Glucose Monitoring Suppl (FREESTYLE FREEDOM LITE) w/Device Does not apply Kit; 1 Device by Other route 2 (two) times daily. Use as directed. Check fasting and 2 hrs after a meal and check as needed.  Dispense: 1 kit; Refill: 0  - Glucose Blood (FREESTYLE LITE TEST) In Vitro Strip; Use as directed.  Dispense: 100 strip; Refill: 0  - FreeStyle Lancets Does not apply Misc; 1 Lancet by Finger stick route as needed (BID and PRN). Use as directed. Check fasting and 2 hrs after a meal and check as needed.  Dispense: 100 each; Refill: 0    6. Essential hypertension  Monitor blood pressure.  Stable.    7. Encounter for annual health examination  Stable.  Continue current care    The patient indicates understanding of these issues and agrees to the plan.  Continue with current  treatment plan.  Patient reassured.  Reinforced healthy diet, lifestyle, and exercise.      No follow-ups on file.     Glenn Gomez DO, 3/10/2025     Supplementary Documentation:   General Health:  In the past six months, have you lost more than 10 pounds without trying?: (Patient-Rptd) 2 - No  Has your appetite been poor?: (Patient-Rptd) No  Type of Diet: (Patient-Rptd) Balanced  How does the patient maintain a good energy level?: (Patient-Rptd) Appropriate Exercise, Daily Walks, Stretching  How would you describe your daily physical activity?: (Patient-Rptd) Moderate  How would you describe your current health state?: (Patient-Rptd) Good  How do you maintain positive mental well-being?: (Patient-Rptd) Social Interaction, Visiting Friends, Visiting Family  On a scale of 0 to 10, with 0 being no pain and 10 being severe pain, what is your pain level?: (Patient-Rptd) 0 - (None)  In the past six months, have you experienced urine leakage?: (Patient-Rptd) 0-No  At any time do you feel concerned for the safety/well-being of yourself and/or your children, in your home or elsewhere?: (Patient-Rptd) No  Have you had any immunizations at another office such as Influenza, Hepatitis B, Tetanus, or Pneumococcal?: (Patient-Rptd) No    Health Maintenance   Topic Date Due    Zoster Vaccines (1 of 2) Never done    Lung Cancer Screening  Never done    COVID-19 Vaccine (4 - 2024-25 season) 09/01/2024    Influenza Vaccine (1) Never done    Diabetes Care Dilated Eye Exam  10/11/2024    Annual Well Visit  01/01/2025    Annual Depression Screening  01/01/2025    Diabetes Care: Foot Exam (Annual)  01/01/2025    Tobacco Cessation Counseling  01/01/2025    Diabetes Care A1C  09/03/2025    Diabetes Care: GFR  03/03/2026    Fall Risk Screening (Annual)  Completed    Diabetes Care: Microalb/Creat Ratio (Annual)  Completed    Pneumococcal Vaccine: 50+ Years  Completed    Meningococcal B Vaccine  Aged Out

## 2025-03-10 NOTE — TELEPHONE ENCOUNTER
Called Iván and notified him that his glucose supplies have been ordered and pharmacy is processing. 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 He stated that he will  tomorrow.

## 2025-04-27 DIAGNOSIS — I10 ESSENTIAL HYPERTENSION: ICD-10-CM

## 2025-04-27 DIAGNOSIS — R73.9 HYPERGLYCEMIA: ICD-10-CM

## 2025-04-28 RX ORDER — METFORMIN HYDROCHLORIDE 500 MG/1
500 TABLET, EXTENDED RELEASE ORAL
Qty: 90 TABLET | Refills: 0 | Status: SHIPPED | OUTPATIENT
Start: 2025-04-28

## 2025-04-28 RX ORDER — METOPROLOL TARTRATE 100 MG/1
100 TABLET ORAL EVERY MORNING
Qty: 90 TABLET | Refills: 0 | Status: SHIPPED | OUTPATIENT
Start: 2025-04-28

## 2025-04-28 NOTE — TELEPHONE ENCOUNTER
LOV:3-10-25    LAST LAB:3-3-25      LAST RX:  metoprolol tartrate 100 MG Oral Tab 90 tablet 0 1/30/2025 --   Sig:   Take 1 tablet (100 mg total) by mouth every morning.       Medication Quantity Refills Start End   metFORMIN  MG Oral Tablet 24 Hr 90 tablet 0 1/30/2025 --   Sig:   Take 1 tablet (500 mg total) by mouth daily with breakfast.         Next OV: No future appointments.       PROTOCOL  Hypertension Medications Protocol [id number]/27/2025 03:00 PM   Protocol Details CMP or BMP in past 12 months    Last BP reading less than 140/90    In person appointment or virtual visit in the past 12 mos or appointment in next 3 mos    EGFRCR or GFRNAA > 50    Medication is active on med list       Diabetes Medication Protocol [id number]/27/2025 03:00 PM   Protocol Details Last A1C < 7.5 and within past 6 months    In person appointment or virtual visit in the past 6 mos or appointment in next 3 mos    Microalbumin procedure in past 12 months or taking ACE/ARB    EGFRCR or GFRNAA > 50    GFR in the past 12 months    Medication is active on med list

## 2025-07-23 DIAGNOSIS — I10 ESSENTIAL HYPERTENSION: ICD-10-CM

## 2025-07-23 DIAGNOSIS — R73.9 HYPERGLYCEMIA: ICD-10-CM

## 2025-07-23 RX ORDER — METOPROLOL TARTRATE 100 MG/1
100 TABLET ORAL EVERY MORNING
Qty: 90 TABLET | Refills: 0 | Status: SHIPPED | OUTPATIENT
Start: 2025-07-23

## 2025-07-23 RX ORDER — METFORMIN HYDROCHLORIDE 500 MG/1
500 TABLET, EXTENDED RELEASE ORAL
Qty: 90 TABLET | Refills: 0 | Status: SHIPPED | OUTPATIENT
Start: 2025-07-23

## 2025-07-23 NOTE — TELEPHONE ENCOUNTER
Name from pharmacy: METFORMIN ER 500MG 24HR TABS         Will file in chart as: METFORMIN  MG Oral Tablet 24 Hr    Sig: TAKE 1 TABLET(500 MG) BY MOUTH DAILY WITH BREAKFAST    Disp: 90 tablet    Refills: 0 (Pharmacy requested: Not specified)    Start: 7/23/2025    Class: Normal    Non-formulary For: Hyperglycemia    Last ordered: 2 months ago (4/28/2025) by Kendall Cai MD    Last refill: 4/28/2025    Rx #: [id number]    Diabetes Medication Protocol [id number]/23/2025 12:38 PM   Protocol Details Last A1C < 7.5 and within past 6 months    In person appointment or virtual visit in the past 6 mos or appointment in next 3 mos    Microalbumin procedure in past 12 months or taking ACE/ARB    EGFRCR or GFRNAA > 50    GFR in the past 12 months    Medication is active on med list       Name from pharmacy: METOPROLOL TARTRATE 100MG TABLETS         Will file in chart as: METOPROLOL TARTRATE 100 MG Oral Tab    Sig: TAKE 1 TABLET(100 MG) BY MOUTH EVERY MORNING    Disp: 90 tablet    Refills: 0 (Pharmacy requested: Not specified)    Start: 7/23/2025    Class: Normal    Non-formulary For: Essential hypertension    Last ordered: 2 months ago (4/28/2025) by Kendall Cai MD    Last refill: 4/28/2025    Rx #: [id number]    Hypertension Medications Protocol [id number]/23/2025 12:38 PM   Protocol Details CMP or BMP in past 12 months    Last BP reading less than 140/90    In person appointment or virtual visit in the past 12 mos or appointment in next 3 mos    EGFRCR or GFRNAA > 50    Medication is active on med list

## (undated) DIAGNOSIS — I10 ESSENTIAL HYPERTENSION: ICD-10-CM

## (undated) DIAGNOSIS — R73.9 HYPERGLYCEMIA: Primary | ICD-10-CM

## (undated) NOTE — LETTER
04/20/19      [address]        Dear Antonino Chavez,    Our records indicate that you are due for fasting blood work (lipid panel, CMP, HgA1c) and urine test (microalbumin level)      Please call our office during normal bu

## (undated) NOTE — LETTER
02/19/18              [facility]  1/3/1950           To Whom it May Concern,     This letter was written at the patient's request.  The above patient was seen in this office on 02/19/2018.   Medications are as follows:  Current outpatient prescriptions

## (undated) NOTE — MR AVS SNAPSHOT
[facility]  [address] [address] [phone]               Thank you for choosing us for your health care visit with Natividad Watson DO.   We are glad to serve you and happy to provide you with this summary of Current Medications          This list is accurate as of: 4/14/17  8:54 AM.  Always use your most recent med list.                BYSTOLIC 5 MG Tabs   Generic drug:  Nebivolol HCl   TAKE 1 TABLET BY MOUTH EVERY DAY           SB LOW DOSE ASA EC 81 MG Tbe Start activities slowly and build up over time Do what you like   Get your heart pumping – brisk walking, biking, swimming Even 10 minute increments are effective and add up over the week   2 ½ hours per week – spread out over time Use a milad to keep you